# Patient Record
Sex: FEMALE | Race: WHITE | NOT HISPANIC OR LATINO | Employment: OTHER | URBAN - METROPOLITAN AREA
[De-identification: names, ages, dates, MRNs, and addresses within clinical notes are randomized per-mention and may not be internally consistent; named-entity substitution may affect disease eponyms.]

---

## 2019-01-07 RX ORDER — LISINOPRIL 20 MG/1
TABLET ORAL
Qty: 90 TABLET | Refills: 3 | Status: SHIPPED | OUTPATIENT
Start: 2019-01-07 | End: 2019-03-13 | Stop reason: ALTCHOICE

## 2019-03-06 PROBLEM — M41.20 IDIOPATHIC SCOLIOSIS AND KYPHOSCOLIOSIS: Status: ACTIVE | Noted: 2019-03-06

## 2019-03-06 PROBLEM — R26.0 ATAXIC GAIT: Status: ACTIVE | Noted: 2019-03-06

## 2019-03-06 PROBLEM — N30.01 ACUTE CYSTITIS WITH HEMATURIA: Status: ACTIVE | Noted: 2019-03-06

## 2019-03-06 PROBLEM — R32 URINARY INCONTINENCE: Status: ACTIVE | Noted: 2019-03-06

## 2019-03-06 PROBLEM — E78.2 MIXED HYPERLIPIDEMIA: Status: ACTIVE | Noted: 2019-03-06

## 2019-03-06 PROBLEM — M19.90 OSTEOARTHRITIS: Status: ACTIVE | Noted: 2019-03-06

## 2019-03-06 PROBLEM — R35.0 INCREASED FREQUENCY OF URINATION: Status: ACTIVE | Noted: 2019-03-06

## 2019-03-06 PROBLEM — K58.1 IRRITABLE BOWEL SYNDROME WITH CONSTIPATION: Status: ACTIVE | Noted: 2019-03-06

## 2019-03-06 PROBLEM — J30.9 ALLERGIC RHINITIS: Status: ACTIVE | Noted: 2019-03-06

## 2019-03-06 PROBLEM — I10 BENIGN ESSENTIAL HYPERTENSION: Status: ACTIVE | Noted: 2019-03-06

## 2019-03-06 PROBLEM — Z91.81 AT RISK FOR FALLS: Status: ACTIVE | Noted: 2019-03-06

## 2019-03-06 PROBLEM — J31.0 NON-ALLERGIC RHINITIS: Status: ACTIVE | Noted: 2019-03-06

## 2019-03-06 PROBLEM — M41.20 IDIOPATHIC SCOLIOSIS: Status: ACTIVE | Noted: 2019-03-06

## 2019-03-06 PROBLEM — K52.1 DIARRHEA DUE TO DRUG: Status: ACTIVE | Noted: 2019-03-06

## 2019-03-06 PROBLEM — M81.0 OSTEOPOROSIS: Status: ACTIVE | Noted: 2019-03-06

## 2019-03-13 ENCOUNTER — OFFICE VISIT (OUTPATIENT)
Dept: INTERNAL MEDICINE | Facility: CLINIC | Age: 84
End: 2019-03-13

## 2019-03-13 VITALS
DIASTOLIC BLOOD PRESSURE: 90 MMHG | BODY MASS INDEX: 20.58 KG/M2 | HEART RATE: 88 BPM | HEIGHT: 61 IN | SYSTOLIC BLOOD PRESSURE: 168 MMHG | TEMPERATURE: 99.2 F | WEIGHT: 109 LBS

## 2019-03-13 DIAGNOSIS — I10 BENIGN ESSENTIAL HYPERTENSION: ICD-10-CM

## 2019-03-13 DIAGNOSIS — E78.2 MIXED HYPERLIPIDEMIA: ICD-10-CM

## 2019-03-13 DIAGNOSIS — J06.9 ACUTE URI: ICD-10-CM

## 2019-03-13 DIAGNOSIS — Z00.00 MEDICARE ANNUAL WELLNESS VISIT, SUBSEQUENT: Primary | ICD-10-CM

## 2019-03-13 DIAGNOSIS — M81.0 AGE-RELATED OSTEOPOROSIS WITHOUT CURRENT PATHOLOGICAL FRACTURE: ICD-10-CM

## 2019-03-13 LAB
25(OH)D3 SERPL-MCNC: 59.3 NG/ML
ALBUMIN SERPL-MCNC: 4.39 G/DL (ref 3.2–4.8)
ALBUMIN/GLOB SERPL: 2.3 G/DL (ref 1.5–2.5)
ALP SERPL-CCNC: 73 U/L (ref 25–100)
ALT SERPL W P-5'-P-CCNC: 24 U/L (ref 7–40)
ANION GAP SERPL CALCULATED.3IONS-SCNC: 10 MMOL/L (ref 3–11)
ARTICHOKE IGE QN: 59 MG/DL (ref 0–130)
AST SERPL-CCNC: 28 U/L (ref 0–33)
BASOPHILS # BLD AUTO: 0.01 10*3/MM3 (ref 0–0.2)
BASOPHILS NFR BLD AUTO: 0.2 % (ref 0–1)
BILIRUB SERPL-MCNC: 0.6 MG/DL (ref 0.3–1.2)
BUN BLD-MCNC: 11 MG/DL (ref 9–23)
BUN/CREAT SERPL: 16.2 (ref 7–25)
CALCIUM SPEC-SCNC: 9.6 MG/DL (ref 8.7–10.4)
CHLORIDE SERPL-SCNC: 102 MMOL/L (ref 99–109)
CHOLEST SERPL-MCNC: 131 MG/DL (ref 0–200)
CO2 SERPL-SCNC: 28 MMOL/L (ref 20–31)
CREAT BLD-MCNC: 0.68 MG/DL (ref 0.6–1.3)
DEPRECATED RDW RBC AUTO: 47.1 FL (ref 37–54)
EOSINOPHIL # BLD AUTO: 0.03 10*3/MM3 (ref 0–0.3)
EOSINOPHIL NFR BLD AUTO: 0.5 % (ref 0–3)
ERYTHROCYTE [DISTWIDTH] IN BLOOD BY AUTOMATED COUNT: 13.2 % (ref 11.3–14.5)
GFR SERPL CREATININE-BSD FRML MDRD: 82 ML/MIN/1.73
GLOBULIN UR ELPH-MCNC: 1.9 GM/DL
GLUCOSE BLD-MCNC: 94 MG/DL (ref 70–100)
HCT VFR BLD AUTO: 38.9 % (ref 34.5–44)
HDLC SERPL-MCNC: 56 MG/DL (ref 40–60)
HGB BLD-MCNC: 13.1 G/DL (ref 11.5–15.5)
IMM GRANULOCYTES # BLD AUTO: 0.01 10*3/MM3 (ref 0–0.05)
IMM GRANULOCYTES NFR BLD AUTO: 0.2 % (ref 0–0.6)
LYMPHOCYTES # BLD AUTO: 1.14 10*3/MM3 (ref 0.6–4.8)
LYMPHOCYTES NFR BLD AUTO: 20.2 % (ref 24–44)
MCH RBC QN AUTO: 32.7 PG (ref 27–31)
MCHC RBC AUTO-ENTMCNC: 33.7 G/DL (ref 32–36)
MCV RBC AUTO: 97 FL (ref 80–99)
MONOCYTES # BLD AUTO: 0.67 10*3/MM3 (ref 0–1)
MONOCYTES NFR BLD AUTO: 11.9 % (ref 0–12)
NEUTROPHILS # BLD AUTO: 3.79 10*3/MM3 (ref 1.5–8.3)
NEUTROPHILS NFR BLD AUTO: 67 % (ref 41–71)
PLATELET # BLD AUTO: 283 10*3/MM3 (ref 150–450)
PMV BLD AUTO: 9.4 FL (ref 6–12)
POTASSIUM BLD-SCNC: 4.2 MMOL/L (ref 3.5–5.5)
PROT SERPL-MCNC: 6.3 G/DL (ref 5.7–8.2)
RBC # BLD AUTO: 4.01 10*6/MM3 (ref 3.89–5.14)
SODIUM BLD-SCNC: 140 MMOL/L (ref 132–146)
TRIGL SERPL-MCNC: 62 MG/DL (ref 0–150)
TSH SERPL DL<=0.05 MIU/L-ACNC: 1.98 MIU/ML (ref 0.35–5.35)
WBC NRBC COR # BLD: 5.65 10*3/MM3 (ref 3.5–10.8)

## 2019-03-13 PROCEDURE — 99213 OFFICE O/P EST LOW 20 MIN: CPT | Performed by: NURSE PRACTITIONER

## 2019-03-13 PROCEDURE — 80061 LIPID PANEL: CPT | Performed by: NURSE PRACTITIONER

## 2019-03-13 PROCEDURE — 80053 COMPREHEN METABOLIC PANEL: CPT | Performed by: NURSE PRACTITIONER

## 2019-03-13 PROCEDURE — 36415 COLL VENOUS BLD VENIPUNCTURE: CPT | Performed by: NURSE PRACTITIONER

## 2019-03-13 PROCEDURE — 82306 VITAMIN D 25 HYDROXY: CPT | Performed by: NURSE PRACTITIONER

## 2019-03-13 PROCEDURE — G0008 ADMIN INFLUENZA VIRUS VAC: HCPCS | Performed by: NURSE PRACTITIONER

## 2019-03-13 PROCEDURE — 84443 ASSAY THYROID STIM HORMONE: CPT | Performed by: NURSE PRACTITIONER

## 2019-03-13 PROCEDURE — G0439 PPPS, SUBSEQ VISIT: HCPCS | Performed by: NURSE PRACTITIONER

## 2019-03-13 PROCEDURE — 90653 IIV ADJUVANT VACCINE IM: CPT | Performed by: NURSE PRACTITIONER

## 2019-03-13 PROCEDURE — 82570 ASSAY OF URINE CREATININE: CPT | Performed by: NURSE PRACTITIONER

## 2019-03-13 PROCEDURE — 82043 UR ALBUMIN QUANTITATIVE: CPT | Performed by: NURSE PRACTITIONER

## 2019-03-13 PROCEDURE — 85025 COMPLETE CBC W/AUTO DIFF WBC: CPT | Performed by: NURSE PRACTITIONER

## 2019-03-13 RX ORDER — CONJUGATED ESTROGENS 0.62 MG/G
1 CREAM VAGINAL 3 TIMES WEEKLY
COMMUNITY
Start: 2018-12-22 | End: 2019-10-10

## 2019-03-13 RX ORDER — CLOPIDOGREL BISULFATE 75 MG/1
1 TABLET ORAL DAILY
COMMUNITY
Start: 2018-12-29 | End: 2020-02-03 | Stop reason: SDUPTHER

## 2019-03-13 RX ORDER — FAMOTIDINE 20 MG/1
1 TABLET, FILM COATED ORAL DAILY
COMMUNITY
Start: 2018-12-29 | End: 2020-02-05 | Stop reason: SDUPTHER

## 2019-03-13 RX ORDER — ASCORBIC ACID 500 MG
1 TABLET ORAL DAILY
COMMUNITY
Start: 2013-01-25 | End: 2022-12-13

## 2019-03-13 RX ORDER — ACETAMINOPHEN ER 650 MG TABLET,EXTENDED RELEASE 650 MG
1 TABLET, EXTENDED RELEASE ORAL DAILY
COMMUNITY
Start: 2015-10-26

## 2019-03-13 RX ORDER — LISINOPRIL 40 MG/1
40 TABLET ORAL DAILY
Qty: 90 TABLET | Refills: 1 | Status: SHIPPED | OUTPATIENT
Start: 2019-03-13 | End: 2019-10-07 | Stop reason: SDUPTHER

## 2019-03-13 RX ORDER — ATORVASTATIN CALCIUM 40 MG/1
1 TABLET, FILM COATED ORAL DAILY
COMMUNITY
Start: 2018-12-29 | End: 2020-02-03 | Stop reason: SDUPTHER

## 2019-03-13 NOTE — PATIENT INSTRUCTIONS
INCREASE YOUR LISINOPRIL TO 40MG DAILY.  TAKE TWO OF YOUR PILLS CURRENTLY UNTIL YOU GET YOUR NEW PRESCRIPTION IN THE MAIL.    Fall Prevention in the Home, Adult  Falls can cause injuries. They can happen to people of all ages. There are many things you can do to make your home safe and to help prevent falls. Ask for help when making these changes, if needed.  What actions can I take to prevent falls?  General Instructions  · Use good lighting in all rooms. Replace any light bulbs that burn out.  · Turn on the lights when you go into a dark area. Use night-lights.  · Keep items that you use often in easy-to-reach places. Lower the shelves around your home if necessary.  · Set up your furniture so you have a clear path. Avoid moving your furniture around.  · Do not have throw rugs and other things on the floor that can make you trip.  · Avoid walking on wet floors.  · If any of your floors are uneven, fix them.  · Add color or contrast paint or tape to clearly ezra and help you see:  ? Any grab bars or handrails.  ? First and last steps of stairways.  ? Where the edge of each step is.  · If you use a stepladder:  ? Make sure that it is fully opened. Do not climb a closed stepladder.  ? Make sure that both sides of the stepladder are locked into place.  ? Ask someone to hold the stepladder for you while you use it.  · If there are any pets around you, be aware of where they are.  What can I do in the bathroom?  · Keep the floor dry. Clean up any water that spills onto the floor as soon as it happens.  · Remove soap buildup in the tub or shower regularly.  · Use non-skid mats or decals on the floor of the tub or shower.  · Attach bath mats securely with double-sided, non-slip rug tape.  · If you need to sit down in the shower, use a plastic, non-slip stool.  · Install grab bars by the toilet and in the tub and shower. Do not use towel bars as grab bars.  What can I do in the bedroom?  · Make sure that you have a light  by your bed that is easy to reach.  · Do not use any sheets or blankets that are too big for your bed. They should not hang down onto the floor.  · Have a firm chair that has side arms. You can use this for support while you get dressed.  What can I do in the kitchen?  · Clean up any spills right away.  · If you need to reach something above you, use a strong step stool that has a grab bar.  · Keep electrical cords out of the way.  · Do not use floor polish or wax that makes floors slippery. If you must use wax, use non-skid floor wax.  What can I do with my stairs?  · Do not leave any items on the stairs.  · Make sure that you have a light switch at the top of the stairs and the bottom of the stairs. If you do not have them, ask someone to add them for you.  · Make sure that there are handrails on both sides of the stairs, and use them. Fix handrails that are broken or loose. Make sure that handrails are as long as the stairways.  · Install non-slip stair treads on all stairs in your home.  · Avoid having throw rugs at the top or bottom of the stairs. If you do have throw rugs, attach them to the floor with carpet tape.  · Choose a carpet that does not hide the edge of the steps on the stairway.  · Check any carpeting to make sure that it is firmly attached to the stairs. Fix any carpet that is loose or worn.  What can I do on the outside of my home?  · Use bright outdoor lighting.  · Regularly fix the edges of walkways and driveways and fix any cracks.  · Remove anything that might make you trip as you walk through a door, such as a raised step or threshold.  · Trim any bushes or trees on the path to your home.  · Regularly check to see if handrails are loose or broken. Make sure that both sides of any steps have handrails.  · Install guardrails along the edges of any raised decks and porches.  · Clear walking paths of anything that might make someone trip, such as tools or rocks.  · Have any leaves, snow, or ice  cleared regularly.  · Use sand or salt on walking paths during winter.  · Clean up any spills in your garage right away. This includes grease or oil spills.  What other actions can I take?  · Wear shoes that:  ? Have a low heel. Do not wear high heels.  ? Have rubber bottoms.  ? Are comfortable and fit you well.  ? Are closed at the toe. Do not wear open-toe sandals.  · Use tools that help you move around (mobility aids) if they are needed. These include:  ? Canes.  ? Walkers.  ? Scooters.  ? Crutches.  · Review your medicines with your doctor. Some medicines can make you feel dizzy. This can increase your chance of falling.  Ask your doctor what other things you can do to help prevent falls.  Where to find more information  · Centers for Disease Control and Prevention, [employer]AD[employer]: https://cdc.gov  · National [place] on Aging: https://aa8dpqz.jordan.nih.gov  Contact a doctor if:  · You are afraid of falling at home.  · You feel weak, drowsy, or dizzy at home.  · You fall at home.  Summary  · There are many simple things that you can do to make your home safe and to help prevent falls.  · Ways to make your home safe include removing tripping hazards and installing grab bars in the bathroom.  · Ask for help when making these changes in your home.  This information is not intended to replace advice given to you by your health care provider. Make sure you discuss any questions you have with your health care provider.  Document Released: 10/14/2010 Document Revised: 08/02/2018 Document Reviewed: 08/02/2018  [employer] Interactive Patient Education © 2019 [employer]    Understanding Your Risk for Falls  Each year, millions of people suffer serious injuries from falls. It is important to understand your risk for falling. Talk with your health care provider about your risk and what you can do to lower it. There are actions you can take at home to lower your risk.  If you do have a serious fall, it is important to tell your  health care provider. Falling once raises your risk for falling again.  How can falls affect me?  Serious injuries from falls are common. These include:  · Broken bones. Most hip fractures are caused by falls.  · Traumatic brain injury (TBI). Falls are the most common cause of TBI.    Fear of falling can also cause you to avoid activities and stay at home. This can make your muscles weaker and actually raise your risk for a fall.  What can increase my risk?  Serious injuries from a fall most often happen to people older than age 65. Children and young adults ages 15-29 are also at higher risk. The more risk factors you have for falling, the higher your risk. Risk factors include:  · Weakness in the lower body.  · Lack (deficiency) of vitamin D.  · Weak bones (osteoporosis).  · Being generally weak or confused due to long-term (chronic) illness.  · Dizziness or balance problems.  · Poor vision.  · Having depression.  · Medicine that causes dizziness or drowsiness. These can include medicines for your blood pressure, heart, anxiety, insomnia, or edema, as well as pain medicines and muscle relaxants.  · Drinking alcohol.  · Foot pain or improper footwear.  · Working at a dangerous job.  · Having had a fall in the past.  · Tripping hazards at home, such as floor clutter or loose rugs, or poor lighting.  · Having pets or clutter in your home.    What actions can I take to lower my risk of falling?  · Maintain physical fitness:  ? Do strength and balance exercises. Consider taking a regular class to build strength and balance. Yoga and khoa chi are good options.  ? Have your eyes checked every year and your vision prescription updated as needed.  · Remove all clutter from walkways and stairways, including extension cords.  · Use a cordless phone.  · Do not use throw rugs. Make sure all carpeting is taped or tacked down securely.  · Use good lighting in all rooms. Keep a flashlight near your bed.  · Make sure there is a  clear path from your bed to the bathroom. Use night-lights.  · Install grab bars for your tub, shower, and toilet. Use a bath mat in your tub or shower.  · Attach secure railings on both sides of your stairs.  · Repair uneven or broken steps.  · Use a cane or walker as directed by your health care provider.  · Wear nonskid shoes. Do not wear high heels. Do not walk around the house in socks or slippers.  · Avoid walking on icy or slippery surfaces. Walk on the grass instead of on icy or slick sidewalks. Where you can, use ice melt to get rid of ice on walkways.  Questions to ask your health care provider  · Can you help me evaluate my risk for a fall?  · Do any of my medicines make me more likely to fall?  · Should I take a vitamin D supplement?  · What exercises can I do to improve my strength and balance?  · Should I make an appointment to have my vision checked?  · Do I need a bone density test to check for osteoporosis?  · Would it help to use a cane or a walker?  Where to find more information  · Centers for Disease Control and Prevention, [place]: cdc.gov  · Community-Based Fall Prevention Programs: cdc.gov  · National [place] on Aging: we9lsvg.jordan.nih.gov  Contact a health care provider if:  · You fall at home.  · You are afraid of falling at home.  · You feel weak, drowsy, or dizzy at home.  Summary  · People 65 and older are at high risk for falling. However, older people are not the only ones injured in falls. Children and young adults have a higher-than-normal risk, too.  · Talk with your health care provider about your risks for falling and how to lower those risks.  · Taking certain precautions at home can lower your risk for falling.  · If you fall, always tell your health care provider.  This information is not intended to replace advice given to you by your health care provider. Make sure you discuss any questions you have with your health care provider.  Document Released: 08/01/2018 Document  Revised: 2018 Document Reviewed: 2018  [employer] Interactive Patient Education © 2019 [employer] Inc.    Medicare Wellness  Personal Prevention Plan of Service     Date of Office Visit:  2019  Encounter Provider:  PAMELA Morrison  Place of Service:  [facility] INTERNAL MEDICINE  Patient Name: Megan Garcia  :  1935    As part of the Medicare Wellness portion of your visit today, we are providing you with this personalized preventive plan of services (PPPS). This plan is based upon recommendations of the [place] Preventive Services Task Force (USPSTF) and the Advisory Committee on Immunization Practices (ACIP).    This lists the preventive care services that should be considered, and provides dates of when you are due. Items listed as completed are up-to-date and do not require any further intervention.    Health Maintenance   Topic Date Due   • TDAP/TD VACCINES (1 - Tdap) 1954   • ZOSTER VACCINE (2 of 3) 2009   • MEDICARE ANNUAL WELLNESS  2019   • LIPID PANEL  2019   • DXA SCAN  2019   • INFLUENZA VACCINE  Completed   • PNEUMOCOCCAL VACCINES (65+ LOW/MEDIUM RISK)  Completed       Orders Placed This Encounter   Procedures   • DEXA Bone Density Axial     Standing Status:   Future     Standing Expiration Date:   3/13/2020     Order Specific Question:   Reason for Exam:     Answer:   osteoporosis   • Fluad Quad >65 years   • Comprehensive Metabolic Panel     Standing Status:   Future     Number of Occurrences:   1   • Lipid Panel     Standing Status:   Future     Number of Occurrences:   1   • Vitamin D 25 Hydroxy     Standing Status:   Future     Number of Occurrences:   1     Standing Expiration Date:   3/13/2020   • TSH Rfx On Abnormal To Free T4     Standing Status:   Future     Number of Occurrences:   1     Standing Expiration Date:   3/13/2020   • Microalbumin / Creatinine Urine Ratio - Urine, Clean Catch     Standing Status:    Future     Number of Occurrences:   1     Standing Expiration Date:   3/13/2020   • CBC Auto Differential   • CBC & Differential     Standing Status:   Future     Number of Occurrences:   1     Order Specific Question:   Manual Differential     Answer:   No       Return for Annual physical, Next scheduled follow up.

## 2019-03-13 NOTE — PROGRESS NOTES
QUICK REFERENCE INFORMATION:  The ABCs of the Annual Wellness Visit    Subsequent Medicare Wellness Visit    HEALTH RISK ASSESSMENT    1935    Recent Hospitalizations:  No hospitalization(s) within the last year..        Current Medical Providers:  Patient Care Team:  Nany Rios APRN as PCP - General (Internal Medicine)        Smoking Status:  Social History     Tobacco Use   Smoking Status Never Smoker   Smokeless Tobacco Never Used       Alcohol Consumption:  Social History     Substance and Sexual Activity   Alcohol Use No   • Frequency: Never       Depression Screen:   PHQ-2/PHQ-9 Depression Screening 3/13/2019   Little interest or pleasure in doing things 0   Feeling down, depressed, or hopeless 0   Total Score 0       Health Habits and Functional and Cognitive Screening:  Functional & Cognitive Status 3/13/2019   Do you have difficulty preparing food and eating? Yes   Do you have difficulty bathing yourself, getting dressed or grooming yourself? No   Do you have difficulty using the toilet? No   Do you have difficulty moving around from place to place? Yes   Do you have trouble with steps or getting out of a bed or a chair? Yes   In the past year have you fallen or experienced a near fall? Yes   Current Diet Well Balanced Diet   Dental Exam Up to date   Eye Exam Up to date   Exercise (times per week) 7 times per week   Current Exercise Activities Include Walking   Do you need help using the phone?  No   Are you deaf or do you have serious difficulty hearing?  No   Do you need help with transportation? Yes   Do you need help shopping? Yes   Do you need help preparing meals?  No   Do you need help with housework?  No   Do you need help with laundry? No   Do you need help taking your medications? No   Do you need help managing money? No   Do you ever drive or ride in a car without wearing a seat belt? No   Have you felt unusual stress, anger or loneliness in the last month? Yes   Who do you live  with? Community   If you need help, do you have trouble finding someone available to you? No   Have you been bothered in the last four weeks by sexual problems? No   Do you have difficulty concentrating, remembering or making decisions? Yes           Does the patient have evidence of cognitive impairment? No    Aspirin use counseling: On clopidrogel as an alternative (due to ASA contraindication)      Recent Lab Results:  CMP:     Lipid Panel:     HbA1c:       Visual Acuity:  No exam data present    Age-appropriate Screening Schedule:  Refer to the list below for future screening recommendations based on patient's age, sex and/or medical conditions. Orders for these recommended tests are listed in the plan section. The patient has been provided with a written plan.    Health Maintenance   Topic Date Due   • TDAP/TD VACCINES (1 - Tdap) [birth year]06/1954   • ZOSTER VACCINE (2 of 3) 04/14/2009   • LIPID PANEL  03/13/2019   • DXA SCAN  03/13/2019   • INFLUENZA VACCINE  Completed   • PNEUMOCOCCAL VACCINES (65+ LOW/MEDIUM RISK)  Completed        Subjective   History of Present Illness    Megan Garcia is a 84 y.o. female who presents for an Subsequent Wellness Visit.    The following portions of the patient's history were reviewed and updated as appropriate: allergies, current medications, past family history, past medical history, past social history, past surgical history and problem list.    Outpatient Medications Prior to Visit   Medication Sig Dispense Refill   • acetaminophen (TYLENOL 8 HOUR ARTHRITIS PAIN) 650 MG 8 hr tablet Take 1 tablet by mouth Daily.     • atorvastatin (LIPITOR) 40 MG tablet Take 1 tablet by mouth Daily.     • clopidogrel (PLAVIX) 75 MG tablet Take 1 tablet by mouth Daily.     • famotidine (PEPCID) 20 MG tablet Take 1 tablet by mouth Daily.     • Multiple Vitamins-Minerals (MULTIVITAMIN ADULT PO) Take 1 tablet by mouth Daily.     • PREMARIN 0.625 MG/GM vaginal cream Insert 1 application into the  vagina 3 (Three) Times a Week.     • vitamin C (ASCORBIC ACID) 500 MG tablet Take 1 tablet by mouth Daily.     • lisinopril (PRINIVIL,ZESTRIL) 20 MG tablet TAKE 1 TABLET BY MOUTH  EVERY DAY 90 tablet 3     No facility-administered medications prior to visit.        Patient Active Problem List   Diagnosis   • Acute cystitis with hematuria   • Allergic rhinitis   • At risk for falls   • Ataxic gait   • Benign essential hypertension   • Diarrhea due to drug   • Idiopathic scoliosis and kyphoscoliosis   • Increased frequency of urination   • Irritable bowel syndrome with constipation   • Mixed hyperlipidemia   • Non-allergic rhinitis   • Osteoarthritis   • Osteoporosis   • Urinary incontinence       Advance Care Planning:  has an advance directive - a copy HAS NOT been provided    Identification of Risk Factors:  Risk factors include: cardiovascular risk, increased fall risk, depression and vision limitations.    Review of Systems   Constitutional: Negative for chills, fatigue and fever.   HENT: Positive for congestion. Negative for ear pain and sinus pressure.    Respiratory: Positive for cough. Negative for chest tightness, shortness of breath and wheezing.    Cardiovascular: Negative for chest pain and palpitations.   Gastrointestinal: Positive for constipation. Negative for abdominal pain and blood in stool.   Skin: Negative for color change.   Allergic/Immunologic: Negative for environmental allergies.   Neurological: Negative for dizziness, speech difficulty and headaches.   Psychiatric/Behavioral: Positive for confusion. The patient is nervous/anxious.        Compared to one year ago, the patient feels her physical health is worse.  Compared to one year ago, the patient feels her mental health is worse.    Objective     Physical Exam    Vitals:    03/13/19 0956   BP: 168/90   BP Location: Left arm   Patient Position: Sitting   Cuff Size: Adult   Pulse: 88   Temp: 99.2 °F (37.3 °C)   TempSrc: Temporal   Weight:  "49.4 kg (109 lb)   Height: 154 cm (60.63\")   PainSc: 0-No pain       Patient's Body mass index is 20.85 kg/m². BMI is below normal parameters. Recommendations include: treating the underlying disease process.      Assessment/Plan   Patient Self-Management and Personalized Health Advice  The patient has been provided with information about: exercise, fall prevention and mental health concerns and preventive services including:   · Bone densitometry screening, Fall Risk plan of care done.    Visit Diagnoses:    ICD-10-CM ICD-9-CM   1. Benign essential hypertension I10 401.1   2. Mixed hyperlipidemia E78.2 272.2   3. Age-related osteoporosis without current pathological fracture M81.0 733.01   4. Acute URI J06.9 465.9       Orders Placed This Encounter   Procedures   • DEXA Bone Density Axial     Standing Status:   Future     Standing Expiration Date:   3/13/2020     Order Specific Question:   Reason for Exam:     Answer:   osteoporosis   • Fluad Quad >65 years   • Comprehensive Metabolic Panel     Standing Status:   Future     Number of Occurrences:   1   • Lipid Panel     Standing Status:   Future     Number of Occurrences:   1   • Vitamin D 25 Hydroxy     Standing Status:   Future     Number of Occurrences:   1     Standing Expiration Date:   3/13/2020   • TSH Rfx On Abnormal To Free T4     Standing Status:   Future     Number of Occurrences:   1     Standing Expiration Date:   3/13/2020   • Microalbumin / Creatinine Urine Ratio - Urine, Clean Catch     Standing Status:   Future     Number of Occurrences:   1     Standing Expiration Date:   3/13/2020   • CBC Auto Differential   • CBC & Differential     Standing Status:   Future     Number of Occurrences:   1     Order Specific Question:   Manual Differential     Answer:   No       Outpatient Encounter Medications as of 3/13/2019   Medication Sig Dispense Refill   • acetaminophen (TYLENOL 8 HOUR ARTHRITIS PAIN) 650 MG 8 hr tablet Take 1 tablet by mouth Daily.     • " atorvastatin (LIPITOR) 40 MG tablet Take 1 tablet by mouth Daily.     • clopidogrel (PLAVIX) 75 MG tablet Take 1 tablet by mouth Daily.     • famotidine (PEPCID) 20 MG tablet Take 1 tablet by mouth Daily.     • Multiple Vitamins-Minerals (MULTIVITAMIN ADULT PO) Take 1 tablet by mouth Daily.     • PREMARIN 0.625 MG/GM vaginal cream Insert 1 application into the vagina 3 (Three) Times a Week.     • vitamin C (ASCORBIC ACID) 500 MG tablet Take 1 tablet by mouth Daily.     • [DISCONTINUED] lisinopril (PRINIVIL,ZESTRIL) 20 MG tablet TAKE 1 TABLET BY MOUTH  EVERY DAY 90 tablet 3   • lisinopril (PRINIVIL,ZESTRIL) 40 MG tablet Take 1 tablet by mouth Daily. 90 tablet 1     No facility-administered encounter medications on file as of 3/13/2019.        Reviewed use of high risk medication in the elderly: not applicable  Reviewed for potential of harmful drug interactions in the elderly: not applicable    Follow Up:  Return for Annual physical, Next scheduled follow up.     An After Visit Summary and PPPS with all of these plans were given to the patient.

## 2019-03-13 NOTE — PROGRESS NOTES
"Megan Garcia  1935  [id number]  Patient Care Team:  Nany Rios APRN as PCP - General (Internal Medicine)    Megan Garcia is a pleasant 84 y.o. female who presents for evaluation of Annual Exam (Patient is fasting) and Cough (x 4 days )          Chief Complaint   Patient presents with   • Annual Exam     Patient is fasting   • Cough     x 4 days        HPI:   Ms. Garcia is a pleasant 84 year old female who presents today for her annual wellness exam, but also with some complaints of a cough, congestion, runny nose and noticeably higher blood pressure readings at home.  Patient states she recently lost her  on November 30, 2018.  She states she is still trying to grieve and has been under significant stress since his passing.  She states she is trying to sell their home, she has moved into a new apartment and trying to figure out how to manage life on her own after a 60 year long marriage.  Ms. Garcia says that for about 5 days now, she has been having sneezing (which has improved), runny nose, congestion and cough (non-productive).  She says that she has noticed since the beginning of the symptoms, she has noticed improvement in all of them.  She denies fever.  She denies any productive cough, no shortness of breath and no chest pain.  She states she has had pneumonia in the past, and wanted to make sure this is not pneumonia again.  She denies any abdominal pain, no back pain and no n/v/d.  She states that her appetite has been normal and has had adequate intake of fluids.    She also states that in February, she was at the  office dealing with some personal issues and she began feeling nauseated, lost her ability to stand and she fell onto a table she was holding onto.  She states that after \"some time\" had passed, and after she got back to her home, she drank some water and felt like it was her \"blood pressure\".  She had someone at the apartment facility take her blood " "pressure and it was \"181/84\".  She states that she has noticed some higher readings.  She denies further c/o of these episodes but feels like her blood pressure is fluctuating at a higher level.  She was with her daughter when this occurred and was not evaluated for her symptoms.        Past Medical History:   Diagnosis Date   • Allergic    • Arthritis    • Hyperlipidemia    • Hypertension    • Intervertebral disc displacement    • Left periventricular lacunar stroke 10/19/2015       Past Surgical History:   Procedure Laterality Date   • LAMINECTOMY  1974   • TOTAL KNEE ARTHROPLASTY Left 2004       Family History   Problem Relation Age of Onset   • Coronary artery disease Father        Social History     Tobacco Use   Smoking Status Never Smoker   Smokeless Tobacco Never Used       No Known Allergies    Review of Systems   Constitutional: Negative.    HENT: Positive for congestion and sinus pressure. Negative for tinnitus and trouble swallowing.    Eyes: Negative for redness and itching.   Respiratory: Positive for cough. Negative for chest tightness, shortness of breath and wheezing.    Cardiovascular: Negative.    Gastrointestinal: Positive for constipation and vomiting. Negative for nausea.   Endocrine: Negative.    Genitourinary: Negative.    Musculoskeletal: Positive for gait problem.   Skin: Negative.    Hematological: Negative.    Psychiatric/Behavioral: Positive for stress. Negative for suicidal ideas. The patient is nervous/anxious.        Vitals:    03/13/19 0956   BP: 168/90   BP Location: Left arm   Patient Position: Sitting   Cuff Size: Adult   Pulse: 88   Temp: 99.2 °F (37.3 °C)   TempSrc: Temporal   Weight: 49.4 kg (109 lb)   Height: 154 cm (60.63\")   PainSc: 0-No pain         Current Outpatient Medications:   •  acetaminophen (TYLENOL 8 HOUR ARTHRITIS PAIN) 650 MG 8 hr tablet, Take 1 tablet by mouth Daily., Disp: , Rfl:   •  atorvastatin (LIPITOR) 40 MG tablet, Take 1 tablet by mouth Daily., Disp: , " Rfl:   •  clopidogrel (PLAVIX) 75 MG tablet, Take 1 tablet by mouth Daily., Disp: , Rfl:   •  famotidine (PEPCID) 20 MG tablet, Take 1 tablet by mouth Daily., Disp: , Rfl:   •  Multiple Vitamins-Minerals (MULTIVITAMIN ADULT PO), Take 1 tablet by mouth Daily., Disp: , Rfl:   •  PREMARIN 0.625 MG/GM vaginal cream, Insert 1 application into the vagina 3 (Three) Times a Week., Disp: , Rfl:   •  vitamin C (ASCORBIC ACID) 500 MG tablet, Take 1 tablet by mouth Daily., Disp: , Rfl:   •  lisinopril (PRINIVIL,ZESTRIL) 40 MG tablet, Take 1 tablet by mouth Daily., Disp: 90 tablet, Rfl: 1    Physical Exam   Constitutional: She is oriented to person, place, and time. She is cooperative. She does not have a sickly appearance. She does not appear ill.   HENT:   Head: Normocephalic.   Right Ear: Tympanic membrane normal. No tenderness. Tympanic membrane is not bulging.   Left Ear: Tympanic membrane normal. No tenderness. Tympanic membrane is not bulging.   Nose: Nose normal.   Mouth/Throat: Uvula is midline, oropharynx is clear and moist and mucous membranes are normal. No oropharyngeal exudate or posterior oropharyngeal edema.   Eyes: Conjunctivae and lids are normal. Pupils are equal, round, and reactive to light.   Neck: Trachea normal and normal range of motion. Neck supple. No thyromegaly present.   Cardiovascular: Regular rhythm, S1 normal, S2 normal, normal heart sounds and normal pulses.   Pulmonary/Chest: Effort normal and breath sounds normal. No respiratory distress. She has no wheezes. She has no rhonchi. She has no rales.   Abdominal: Soft. Normal appearance. There is no tenderness.   Lymphadenopathy:     She has no cervical adenopathy.   Neurological: She is alert and oriented to person, place, and time. GCS eye subscore is 4. GCS verbal subscore is 5. GCS motor subscore is 6.   Skin: Skin is warm and dry. No rash noted.   Psychiatric: Her speech is normal and behavior is normal. Judgment and thought content normal.  Her mood appears anxious. Cognition and memory are normal.       Results Review:    None    Procedures    Assessment/Plan:    Problem List Items Addressed This Visit        Cardiovascular and Mediastinum    Benign essential hypertension    Relevant Medications    lisinopril (PRINIVIL,ZESTRIL) 40 MG tablet    Other Relevant Orders    Comprehensive Metabolic Panel    CBC & Differential    Microalbumin / Creatinine Urine Ratio - Urine, Clean Catch    CBC Auto Differential    Mixed hyperlipidemia    Relevant Medications    atorvastatin (LIPITOR) 40 MG tablet    Other Relevant Orders    Lipid Panel    TSH Rfx On Abnormal To Free T4       Musculoskeletal and Integument    Osteoporosis    Relevant Orders    Vitamin D 25 Hydroxy    DEXA Bone Density Axial      Other Visit Diagnoses     Medicare annual wellness visit, subsequent    -  Primary    Acute URI          INCREASE YOUR LISINOPRIL TO 40MG DAILY.  TAKE TWO OF YOUR PILLS CURRENTLY UNTIL YOU GET YOUR NEW PRESCRIPTION IN THE MAIL.      Plan of care reviewed with patient at the conclusion of today's visit. Education was provided regarding diagnosis, management and any prescribed or recommended OTC medications.  Patient verbalizes understanding of and agreement with management plan.    Patient instructed to increase her Lisinopril dose to 40mg daily.  A new prescription was sent for her but she was instructed to take two of her pills until her new medication arrives.         Return for Annual physical, Next scheduled follow up.    *Note that portions of this note were completed with a voice recognition program.  Efforts were made to edit the dictation but occasionally words are transcribed.    PAMELA Morrison

## 2019-03-15 LAB
CREAT 24H UR-MCNC: 58.9 MG/DL
MICROALBUMIN UR-MCNC: 6.3 UG/ML
MICROALBUMIN/CREAT UR: 10.7 MG/G CREAT (ref 0–30)

## 2019-03-27 ENCOUNTER — OFFICE VISIT (OUTPATIENT)
Dept: INTERNAL MEDICINE | Facility: CLINIC | Age: 84
End: 2019-03-27

## 2019-03-27 VITALS
BODY MASS INDEX: 20.77 KG/M2 | TEMPERATURE: 99 F | SYSTOLIC BLOOD PRESSURE: 164 MMHG | WEIGHT: 110 LBS | HEIGHT: 61 IN | DIASTOLIC BLOOD PRESSURE: 74 MMHG | HEART RATE: 80 BPM

## 2019-03-27 DIAGNOSIS — E78.2 MIXED HYPERLIPIDEMIA: ICD-10-CM

## 2019-03-27 DIAGNOSIS — M41.20 IDIOPATHIC SCOLIOSIS AND KYPHOSCOLIOSIS: ICD-10-CM

## 2019-03-27 DIAGNOSIS — I10 BENIGN ESSENTIAL HYPERTENSION: ICD-10-CM

## 2019-03-27 DIAGNOSIS — J31.0 NON-ALLERGIC RHINITIS: ICD-10-CM

## 2019-03-27 DIAGNOSIS — Z00.00 PREVENTATIVE HEALTH CARE: Primary | ICD-10-CM

## 2019-03-27 DIAGNOSIS — M81.0 AGE-RELATED OSTEOPOROSIS WITHOUT CURRENT PATHOLOGICAL FRACTURE: ICD-10-CM

## 2019-03-27 RX ORDER — ACETAMINOPHEN 500 MG
500 TABLET ORAL
COMMUNITY
End: 2021-08-02

## 2019-03-27 NOTE — PROGRESS NOTES
QUICK REFERENCE INFORMATION:  The ABCs of the Annual Wellness Visit    Initial Medicare Wellness Visit    HEALTH RISK ASSESSMENT    1935    Recent Hospitalizations:  No hospitalization(s) within the last year..        Current Medical Providers:  Patient Care Team:  Nany Rios APRN as PCP - General (Internal Medicine)        Smoking Status:  Social History     Tobacco Use   Smoking Status Never Smoker   Smokeless Tobacco Never Used       Alcohol Consumption:  Social History     Substance and Sexual Activity   Alcohol Use No   • Frequency: Never       Depression Screen:   PHQ-2/PHQ-9 Depression Screening 3/13/2019   Little interest or pleasure in doing things 0   Feeling down, depressed, or hopeless 0   Total Score 0       Health Habits and Functional and Cognitive Screening:  Functional & Cognitive Status 3/13/2019   Do you have difficulty preparing food and eating? Yes   Do you have difficulty bathing yourself, getting dressed or grooming yourself? No   Do you have difficulty using the toilet? No   Do you have difficulty moving around from place to place? Yes   Do you have trouble with steps or getting out of a bed or a chair? Yes   In the past year have you fallen or experienced a near fall? Yes   Current Diet Well Balanced Diet   Dental Exam Up to date   Eye Exam Up to date   Exercise (times per week) 7 times per week   Current Exercise Activities Include Walking   Do you need help using the phone?  No   Are you deaf or do you have serious difficulty hearing?  No   Do you need help with transportation? Yes   Do you need help shopping? Yes   Do you need help preparing meals?  No   Do you need help with housework?  No   Do you need help with laundry? No   Do you need help taking your medications? No   Do you need help managing money? No   Do you ever drive or ride in a car without wearing a seat belt? No   Have you felt unusual stress, anger or loneliness in the last month? Yes   Who do you live with?  Community   If you need help, do you have trouble finding someone available to you? No   Have you been bothered in the last four weeks by sexual problems? No   Do you have difficulty concentrating, remembering or making decisions? Yes           Does the patient have evidence of cognitive impairment? Yes    Asiprin use counseling: Does not need ASA (and currently is not on it)      Recent Lab Results:  CMP:  Lab Results   Component Value Date    BUN 11 03/13/2019    CREATININE 0.68 03/13/2019    EGFRIFNONA 82 03/13/2019    BCR 16.2 03/13/2019     03/13/2019    K 4.2 03/13/2019    CO2 28.0 03/13/2019    CALCIUM 9.6 03/13/2019    ALBUMIN 4.39 03/13/2019    BILITOT 0.6 03/13/2019    ALKPHOS 73 03/13/2019    AST 28 03/13/2019    ALT 24 03/13/2019     HbA1c:  No results found for: HGBA1C  Microalbumin:  Lab Results   Component Value Date    MICROALBUR 6.3 03/13/2019     Lipid Panel  Lab Results   Component Value Date    CHOL 131 03/13/2019    TRIG 62 03/13/2019    HDL 56 03/13/2019    LDL 59 03/13/2019    AST 28 03/13/2019    ALT 24 03/13/2019       Visual Acuity:  No exam data present    Age-appropriate Screening Schedule:  Refer to the list below for future screening recommendations based on patient's age, sex and/or medical conditions. Orders for these recommended tests are listed in the plan section. The patient has been provided with a written plan.    Health Maintenance   Topic Date Due   • TDAP/TD VACCINES (1 - Tdap) 03/06/1954   • ZOSTER VACCINE (2 of 3) 04/14/2009   • DXA SCAN  03/13/2019   • LIPID PANEL  03/13/2020   • INFLUENZA VACCINE  Completed   • PNEUMOCOCCAL VACCINES (65+ LOW/MEDIUM RISK)  Completed        Subjective   History of Present Illness    Megan Garcia is a 84 y.o. female who presents for an Annual Wellness Visit.  Since I last saw her she lost her .  Prior to his death they had plan to move into a seniors facility.  She has moved there and has made the adjustment.  She feels that  she has adequate support, participates in activities there and her family has been supportive.  She continues to experience back pain but has not fallen.  She otherwise feels well.    The following portions of the patient's history were reviewed and updated as appropriate: allergies, current medications, past family history, past medical history, past social history, past surgical history and problem list.    Outpatient Medications Prior to Visit   Medication Sig Dispense Refill   • acetaminophen (TYLENOL) 500 MG tablet Take 1,000 mg by mouth. Takes 2 tablets with breakfast  2 tablets w/supper     • atorvastatin (LIPITOR) 40 MG tablet Take 1 tablet by mouth Daily.     • CALCIUM PO Take 1 tablet by mouth Daily. 2 gummies  Tries to take it daily     • clopidogrel (PLAVIX) 75 MG tablet Take 1 tablet by mouth Daily.     • famotidine (PEPCID) 20 MG tablet Take 1 tablet by mouth Daily.     • lisinopril (PRINIVIL,ZESTRIL) 40 MG tablet Take 1 tablet by mouth Daily. 90 tablet 1   • Multiple Vitamins-Minerals (MULTIVITAMIN ADULT PO) Take 1 tablet by mouth Daily.     • vitamin C (ASCORBIC ACID) 500 MG tablet Take 1 tablet by mouth Daily.     • acetaminophen (TYLENOL 8 HOUR ARTHRITIS PAIN) 650 MG 8 hr tablet Take 1 tablet by mouth Daily.     • PREMARIN 0.625 MG/GM vaginal cream Insert 1 application into the vagina 3 (Three) Times a Week.       No facility-administered medications prior to visit.        Patient Active Problem List   Diagnosis   • Allergic rhinitis   • At risk for falls   • Ataxic gait   • Benign essential hypertension   • Idiopathic scoliosis and kyphoscoliosis   • Irritable bowel syndrome with constipation   • Mixed hyperlipidemia   • Non-allergic rhinitis   • Osteoarthritis   • Osteoporosis   • Urinary incontinence       Advance Care Planning:  Patient has an advance directive - a copy has not been provided. Have asked the patient to send this to us to add to record    Identification of Risk Factors:  Risk  factors include: Depression Screen Positive;   Discussed her major life changes at length.  She has good support, and denies feeling depressed. .    Review of Systems   Constitutional: Positive for fatigue. Negative for chills and fever.   HENT: Positive for congestion and postnasal drip. Negative for ear pain and sinus pressure.    Respiratory: Negative for cough, chest tightness, shortness of breath and wheezing.    Cardiovascular: Negative for chest pain and palpitations.   Gastrointestinal: Positive for diarrhea. Negative for abdominal pain, blood in stool and constipation.   Skin: Negative for color change.   Allergic/Immunologic: Negative for environmental allergies.   Neurological: Negative for dizziness, speech difficulty and headaches.   Psychiatric/Behavioral: Negative for confusion. The patient is nervous/anxious.        Compared to one year ago, the patient feels her physical health is the same.  Compared to one year ago, the patient feels her mental health is the same.    Objective     Physical Exam   Constitutional: She is oriented to person, place, and time. She appears well-developed and well-nourished.   HENT:   Head: Normocephalic and atraumatic.   Right Ear: External ear normal.   Left Ear: External ear normal.   Nose: Nose normal.   Mouth/Throat: Oropharynx is clear and moist. No oropharyngeal exudate.   Eyes: Conjunctivae and EOM are normal. Pupils are equal, round, and reactive to light.   Neck: Normal range of motion. Neck supple. No JVD present. No thyromegaly present.   Cardiovascular: Normal rate, regular rhythm, normal heart sounds and intact distal pulses. Exam reveals no gallop and no friction rub.   No murmur heard.  Pulmonary/Chest: Effort normal and breath sounds normal. No respiratory distress. She has no wheezes. She has no rales.   Abdominal: Soft. Bowel sounds are normal. She exhibits no distension and no mass. There is no tenderness. There is no rebound and no guarding. No  "hernia.   Musculoskeletal: Normal range of motion. She exhibits no edema or tenderness.   Lymphadenopathy:     She has no cervical adenopathy.   Neurological: She is alert and oriented to person, place, and time. She displays normal reflexes. No cranial nerve deficit or sensory deficit. She exhibits normal muscle tone. Coordination normal.   Skin: Skin is warm and dry. No rash noted. No erythema.   Psychiatric: She has a normal mood and affect. Her behavior is normal. Judgment and thought content normal.   Nursing note and vitals reviewed.       Procedures     Vitals:    03/27/19 1308   BP: 164/74   BP Location: Left arm   Patient Position: Sitting   Cuff Size: Adult   Pulse: 80   Temp: 99 °F (37.2 °C)   TempSrc: Temporal   Weight: 49.9 kg (110 lb)   Height: 154 cm (60.63\")   PainSc:   5   PainLoc: Hip       Patient's Body mass index is 21.04 kg/m². BMI is within normal parameters. No follow-up required..      Assessment/Plan   Patient Self-Management and Personalized Health Advice  The patient has been provided with information about: diet, exercise and mental health concerns and preventive services including:   · Depression Screening (15 minutes face to face, Code ).    Visit Diagnoses:    ICD-10-CM ICD-9-CM   1. Benign essential hypertension I10 401.1   2. Mixed hyperlipidemia E78.2 272.2   3. Idiopathic scoliosis and kyphoscoliosis M41.20 737.30   4. Age-related osteoporosis without current pathological fracture M81.0 733.01   5. Non-allergic rhinitis J31.0 472.0     Overall she is doing well but she is gone through 2 major life changes, including the loss of her , and the move from her home to [facility] apartment.  She seems to be coping well, participating with activities and has family support.    No orders of the defined types were placed in this encounter.      Outpatient Encounter Medications as of 3/27/2019   Medication Sig Dispense Refill   • acetaminophen (TYLENOL) 500 MG tablet Take 1,000 " mg by mouth. Takes 2 tablets with breakfast  2 tablets w/supper     • atorvastatin (LIPITOR) 40 MG tablet Take 1 tablet by mouth Daily.     • CALCIUM PO Take 1 tablet by mouth Daily. 2 gummies  Tries to take it daily     • clopidogrel (PLAVIX) 75 MG tablet Take 1 tablet by mouth Daily.     • famotidine (PEPCID) 20 MG tablet Take 1 tablet by mouth Daily.     • lisinopril (PRINIVIL,ZESTRIL) 40 MG tablet Take 1 tablet by mouth Daily. 90 tablet 1   • Multiple Vitamins-Minerals (MULTIVITAMIN ADULT PO) Take 1 tablet by mouth Daily.     • vitamin C (ASCORBIC ACID) 500 MG tablet Take 1 tablet by mouth Daily.     • acetaminophen (TYLENOL 8 HOUR ARTHRITIS PAIN) 650 MG 8 hr tablet Take 1 tablet by mouth Daily.     • PREMARIN 0.625 MG/GM vaginal cream Insert 1 application into the vagina 3 (Three) Times a Week.       No facility-administered encounter medications on file as of 3/27/2019.        Reviewed use of high risk medication in the elderly: yes  Reviewed for potential of harmful drug interactions in the elderly: yes    Follow Up:  No Follow-up on file.     An After Visit Summary and PPPS with all of these plans were given to the patient.

## 2019-04-25 ENCOUNTER — TELEPHONE (OUTPATIENT)
Dept: INTERNAL MEDICINE | Facility: CLINIC | Age: 84
End: 2019-04-25

## 2019-04-25 NOTE — TELEPHONE ENCOUNTER
Patient's daughter called requesting a handicap parking permit form to be completed and she will  at  Friday morning.

## 2019-05-02 ENCOUNTER — OFFICE VISIT (OUTPATIENT)
Dept: INTERNAL MEDICINE | Facility: CLINIC | Age: 84
End: 2019-05-02

## 2019-05-02 VITALS
HEIGHT: 61 IN | WEIGHT: 114 LBS | DIASTOLIC BLOOD PRESSURE: 62 MMHG | SYSTOLIC BLOOD PRESSURE: 146 MMHG | HEART RATE: 80 BPM | BODY MASS INDEX: 21.52 KG/M2

## 2019-05-02 DIAGNOSIS — I10 BENIGN ESSENTIAL HYPERTENSION: Primary | ICD-10-CM

## 2019-05-02 DIAGNOSIS — M25.551 RIGHT HIP PAIN: ICD-10-CM

## 2019-05-02 PROCEDURE — 99213 OFFICE O/P EST LOW 20 MIN: CPT | Performed by: INTERNAL MEDICINE

## 2019-05-02 RX ORDER — AMLODIPINE BESYLATE 5 MG/1
1 TABLET ORAL DAILY
COMMUNITY
Start: 2019-04-25 | End: 2019-05-02 | Stop reason: SDUPTHER

## 2019-05-02 RX ORDER — AMLODIPINE BESYLATE 10 MG/1
10 TABLET ORAL DAILY
Qty: 30 TABLET | Refills: 5 | Status: SHIPPED | OUTPATIENT
Start: 2019-05-02 | End: 2019-11-09 | Stop reason: SDUPTHER

## 2019-05-02 NOTE — PROGRESS NOTES
[facility]     Megan Garcia  1935   [id number]      Patient Care Team:  Nany Rios APRN as PCP - General (Internal Medicine)    Chief Complaint::   Chief Complaint   Patient presents with   • Transitional Care Management   • Hypertension   • Hip Pain        HPI  Mrs. Garcia presented to [facility] emergency room on April 24 with significantly high blood pressure.  Systolic blood pressure was over 200.  She was given IV labetalol which brought her blood pressure down and then started on amlodipine.  Laboratory studies and CT scan of the head were normal.  In March when she was here for her visit, her blood pressure was 164 systolic.  I had asked her at that time to monitor her blood pressure and forward readings.  She had not done that until the 24th and was alarmed at the elevation.  Since last week she is taken amlodipine along with lisinopril, her systolic readings have ranged between 140 and 160.  She has edema but it is no worse than before she started taking amlodipine.  Her only other complaint is right hip pain.  She switch from Aleve, which worked well to Tylenol which does not work at all.    Chronic Conditions:      Patient Active Problem List   Diagnosis   • Allergic rhinitis   • At risk for falls   • Ataxic gait   • Benign essential hypertension   • Idiopathic scoliosis and kyphoscoliosis   • Irritable bowel syndrome with constipation   • Mixed hyperlipidemia   • Non-allergic rhinitis   • Osteoarthritis   • Osteoporosis   • Urinary incontinence        Past Medical History:   Diagnosis Date   • Allergic    • Arthritis    • Hyperlipidemia    • Hypertension    • Intervertebral disc displacement    • Left periventricular lacunar stroke 10/19/2015       Past Surgical History:   Procedure Laterality Date   • LAMINECTOMY  1974   • TOTAL KNEE ARTHROPLASTY Left 2004       Family History   Problem Relation Age of Onset   • Coronary artery disease Father        Social  History     Socioeconomic History   • Marital status:      Spouse name: Not on file   • Number of children: Not on file   • Years of education: Not on file   • Highest education level: Not on file   Tobacco Use   • Smoking status: Never Smoker   • Smokeless tobacco: Never Used   Substance and Sexual Activity   • Alcohol use: No     Frequency: Never   • Drug use: No   • Sexual activity: Defer       No Known Allergies      Current Outpatient Medications:   •  acetaminophen (TYLENOL 8 HOUR ARTHRITIS PAIN) 650 MG 8 hr tablet, Take 1 tablet by mouth Daily., Disp: , Rfl:   •  acetaminophen (TYLENOL) 500 MG tablet, Take 1,000 mg by mouth. Takes 2 tablets with breakfast  2 tablets w/supper, Disp: , Rfl:   •  amLODIPine (NORVASC) 10 MG tablet, Take 1 tablet by mouth Daily., Disp: 30 tablet, Rfl: 5  •  atorvastatin (LIPITOR) 40 MG tablet, Take 1 tablet by mouth Daily., Disp: , Rfl:   •  CALCIUM PO, Take 1 tablet by mouth Daily. 2 gummies  Tries to take it daily, Disp: , Rfl:   •  clopidogrel (PLAVIX) 75 MG tablet, Take 1 tablet by mouth Daily., Disp: , Rfl:   •  famotidine (PEPCID) 20 MG tablet, Take 1 tablet by mouth Daily., Disp: , Rfl:   •  lisinopril (PRINIVIL,ZESTRIL) 40 MG tablet, Take 1 tablet by mouth Daily., Disp: 90 tablet, Rfl: 1  •  Multiple Vitamins-Minerals (MULTIVITAMIN ADULT PO), Take 1 tablet by mouth Daily., Disp: , Rfl:   •  PREMARIN 0.625 MG/GM vaginal cream, Insert 1 application into the vagina 3 (Three) Times a Week., Disp: , Rfl:   •  vitamin C (ASCORBIC ACID) 500 MG tablet, Take 1 tablet by mouth Daily., Disp: , Rfl:     Review of Systems   Constitutional: Negative for chills, fatigue and fever.   HENT: Negative for congestion, ear pain and sinus pressure.    Respiratory: Negative for cough, chest tightness, shortness of breath and wheezing.    Cardiovascular: Negative for chest pain and palpitations.   Gastrointestinal: Negative for abdominal pain, blood in stool and constipation.   Skin:  "Negative for color change.   Allergic/Immunologic: Negative for environmental allergies.   Neurological: Negative for dizziness, speech difficulty and headache.   Psychiatric/Behavioral: Negative for decreased concentration. The patient is not nervous/anxious.         Vital Signs  Vitals:    05/02/19 1148   BP: 146/62   BP Location: Left arm   Patient Position: Sitting   Cuff Size: Adult   Pulse: 80   Weight: 51.7 kg (114 lb)   Height: 154 cm (60.63\")       Physical Exam   Constitutional: She is oriented to person, place, and time. She appears well-developed and well-nourished.   HENT:   Head: Normocephalic and atraumatic.   Cardiovascular: Normal rate, regular rhythm and normal heart sounds.   No murmur heard.  Pulmonary/Chest: Effort normal and breath sounds normal.   Neurological: She is alert and oriented to person, place, and time.   Psychiatric: She has a normal mood and affect.   Vitals reviewed.     Procedures    ACE III MINI             Assessment/Plan:    Megan was seen today for transitional care management, hypertension and hip pain.    Diagnoses and all orders for this visit:    Benign essential hypertension    Right hip pain    Other orders  -     amLODIPine (NORVASC) 10 MG tablet; Take 1 tablet by mouth Daily.    Plan    She will increase amlodipine to 10 mg a day and continue lisinopril at the current dose of 40 mg.  I have asked her to monitor her blood pressure daily some days in the morning some days in the evening.  Goal for her systolic blood pressure is 120-140.  Because of her incontinence, should she need additional blood pressure lowering medication, a diuretic would probably not be a good choice.    She may take Aleve 200 mg twice a day with meals and supplement with Tylenol if needed.  Her renal function is good and she is watching her blood pressure closely.          Plan of care reviewed with patient at the conclusion of today's visit. Education was provided regarding diagnosis, " management, and any prescribed or recommended OTC medications.Patient verbalizes understanding of and agreement with management plan.         Juan Carlos Dunn MD

## 2019-05-02 NOTE — PATIENT INSTRUCTIONS
Increase amlodipine to 10 mg a day.  You may take one alleve twice a day, breakfast and supper.  If needed, you may add one tylenol to each alleve.

## 2019-05-16 ENCOUNTER — OFFICE VISIT (OUTPATIENT)
Dept: INTERNAL MEDICINE | Facility: CLINIC | Age: 84
End: 2019-05-16

## 2019-05-16 VITALS
WEIGHT: 115 LBS | DIASTOLIC BLOOD PRESSURE: 62 MMHG | HEART RATE: 84 BPM | BODY MASS INDEX: 21.71 KG/M2 | SYSTOLIC BLOOD PRESSURE: 142 MMHG | HEIGHT: 61 IN

## 2019-05-16 DIAGNOSIS — I10 BENIGN ESSENTIAL HYPERTENSION: Primary | ICD-10-CM

## 2019-05-16 PROCEDURE — 99213 OFFICE O/P EST LOW 20 MIN: CPT | Performed by: INTERNAL MEDICINE

## 2019-05-16 NOTE — PROGRESS NOTES
[facility]     Megan Garcia  1935   [id number]      Patient Care Team:  Juan Carlos Dunn MD as PCP - General (Internal Medicine)    Chief Complaint::   Chief Complaint   Patient presents with   • Hyperlipidemia   • Hypertension   • Osteoarthritis   • Irritable Bowel Syndrome   • Allergic Rhinitis        HPI  Mrs. Garcia comes in for follow-up of her hypertension.  Blood pressures have remained within normal limits, and she feels much better.  She ambulates with her walker and has not fallen.    Chronic Conditions:      Patient Active Problem List   Diagnosis   • Allergic rhinitis   • At risk for falls   • Ataxic gait   • Benign essential hypertension   • Idiopathic scoliosis and kyphoscoliosis   • Irritable bowel syndrome with constipation   • Mixed hyperlipidemia   • Non-allergic rhinitis   • Osteoarthritis   • Osteoporosis   • Urinary incontinence        Past Medical History:   Diagnosis Date   • Allergic    • Arthritis    • Hyperlipidemia    • Hypertension    • Intervertebral disc displacement    • Left periventricular lacunar stroke 10/19/2015       Past Surgical History:   Procedure Laterality Date   • LAMINECTOMY  1974   • TOTAL KNEE ARTHROPLASTY Left 2004       Family History   Problem Relation Age of Onset   • Coronary artery disease Father        Social History     Socioeconomic History   • Marital status:      Spouse name: Not on file   • Number of children: Not on file   • Years of education: Not on file   • Highest education level: Not on file   Tobacco Use   • Smoking status: Never Smoker   • Smokeless tobacco: Never Used   Substance and Sexual Activity   • Alcohol use: No     Frequency: Never   • Drug use: No   • Sexual activity: Defer       No Known Allergies      Current Outpatient Medications:   •  acetaminophen (TYLENOL) 500 MG tablet, Take 500 mg by mouth 2 (Two) Times a Day. Takes 1 tablet with breakfast  And 1 tablet w/supper, Disp: , Rfl:   •  amLODIPine  "(NORVASC) 10 MG tablet, Take 1 tablet by mouth Daily., Disp: 30 tablet, Rfl: 5  •  atorvastatin (LIPITOR) 40 MG tablet, Take 1 tablet by mouth Daily., Disp: , Rfl:   •  CALCIUM PO, Take 1 tablet by mouth Daily. 2 gummies  Tries to take it daily, Disp: , Rfl:   •  clopidogrel (PLAVIX) 75 MG tablet, Take 1 tablet by mouth Daily., Disp: , Rfl:   •  famotidine (PEPCID) 20 MG tablet, Take 1 tablet by mouth Daily., Disp: , Rfl:   •  lisinopril (PRINIVIL,ZESTRIL) 40 MG tablet, Take 1 tablet by mouth Daily., Disp: 90 tablet, Rfl: 1  •  Multiple Vitamins-Minerals (MULTIVITAMIN ADULT PO), Take 1 tablet by mouth Daily., Disp: , Rfl:   •  PREMARIN 0.625 MG/GM vaginal cream, Insert 1 application into the vagina 3 (Three) Times a Week., Disp: , Rfl:   •  vitamin C (ASCORBIC ACID) 500 MG tablet, Take 1 tablet by mouth Daily., Disp: , Rfl:   •  acetaminophen (TYLENOL 8 HOUR ARTHRITIS PAIN) 650 MG 8 hr tablet, Take 1 tablet by mouth Daily., Disp: , Rfl:     Review of Systems   Constitutional: Negative for chills, fatigue and fever.   HENT: Negative for congestion, ear pain and sinus pressure.    Respiratory: Negative for cough, chest tightness, shortness of breath and wheezing.    Cardiovascular: Negative for chest pain and palpitations.   Gastrointestinal: Negative for abdominal pain, blood in stool and constipation.   Skin: Negative for color change.   Allergic/Immunologic: Negative for environmental allergies.   Neurological: Negative for dizziness, speech difficulty and headache.   Psychiatric/Behavioral: Negative for decreased concentration. The patient is not nervous/anxious.         Vital Signs  Vitals:    05/16/19 1003   BP: 142/62   BP Location: Left arm   Patient Position: Sitting   Cuff Size: Adult   Pulse: 84   Weight: 52.2 kg (115 lb)   Height: 154 cm (60.63\")       Physical Exam   Constitutional: She is oriented to person, place, and time. She appears well-developed and well-nourished.   HENT:   Head: Normocephalic and " atraumatic.   Cardiovascular: Normal rate, regular rhythm and normal heart sounds.   No murmur heard.  Pulmonary/Chest: Effort normal and breath sounds normal.   Neurological: She is alert and oriented to person, place, and time.   Psychiatric: She has a normal mood and affect.   Vitals reviewed.     Procedures    ACE III MINI             Assessment/Plan:    Megan was seen today for hyperlipidemia, hypertension, osteoarthritis, irritable bowel syndrome and allergic rhinitis.    Diagnoses and all orders for this visit:    Benign essential hypertension    Plan    Blood pressure now well controlled with amlodipine 10 mg a day and lisinopril 40 mg a day.  He will continue monitoring blood pressure and remain active.      Plan of care reviewed with patient at the conclusion of today's visit. Education was provided regarding diagnosis, management, and any prescribed or recommended OTC medications.Patient verbalizes understanding of and agreement with management plan.         Juan Carlos Dunn MD

## 2019-05-29 ENCOUNTER — TELEPHONE (OUTPATIENT)
Dept: INTERNAL MEDICINE | Facility: CLINIC | Age: 84
End: 2019-05-29

## 2019-05-29 NOTE — TELEPHONE ENCOUNTER
PATIENT WANTS TO KNOW IF SHE NEEDS ANOTHER BONE DENSITY TEST AGAIN.  SHE SAYS THE CLINIC HAS BEEN CALLING HER THAT SHE IS  ELIGIBLE FOR A FREE TESTING.

## 2019-05-29 NOTE — TELEPHONE ENCOUNTER
Her last one was in 2016, so she will soon be due.  If a free one is offered she should take advantage of that.

## 2019-07-17 ENCOUNTER — HOSPITAL ENCOUNTER (OUTPATIENT)
Dept: BONE DENSITY | Facility: HOSPITAL | Age: 84
Discharge: HOME | End: 2019-07-17
Admitting: NURSE PRACTITIONER

## 2019-07-17 DIAGNOSIS — M81.0 AGE-RELATED OSTEOPOROSIS WITHOUT CURRENT PATHOLOGICAL FRACTURE: ICD-10-CM

## 2019-07-17 PROCEDURE — 77080 DXA BONE DENSITY AXIAL: CPT

## 2019-08-20 ENCOUNTER — TELEPHONE (OUTPATIENT)
Dept: INTERNAL MEDICINE | Facility: CLINIC | Age: 84
End: 2019-08-20

## 2019-08-22 ENCOUNTER — HOSPITAL ENCOUNTER (OUTPATIENT)
Dept: GENERAL RADIOLOGY | Facility: HOSPITAL | Age: 84
Discharge: HOME | End: 2019-08-22
Admitting: INTERNAL MEDICINE

## 2019-08-22 ENCOUNTER — OFFICE VISIT (OUTPATIENT)
Dept: INTERNAL MEDICINE | Facility: CLINIC | Age: 84
End: 2019-08-22

## 2019-08-22 VITALS
DIASTOLIC BLOOD PRESSURE: 64 MMHG | SYSTOLIC BLOOD PRESSURE: 138 MMHG | BODY MASS INDEX: 21.34 KG/M2 | HEART RATE: 96 BPM | WEIGHT: 113 LBS | HEIGHT: 61 IN

## 2019-08-22 DIAGNOSIS — K59.04 CHRONIC IDIOPATHIC CONSTIPATION: ICD-10-CM

## 2019-08-22 DIAGNOSIS — R10.84 GENERALIZED ABDOMINAL PAIN: ICD-10-CM

## 2019-08-22 DIAGNOSIS — R10.84 GENERALIZED ABDOMINAL PAIN: Primary | ICD-10-CM

## 2019-08-22 DIAGNOSIS — K59.1 FUNCTIONAL DIARRHEA: ICD-10-CM

## 2019-08-22 DIAGNOSIS — M81.0 AGE-RELATED OSTEOPOROSIS WITHOUT CURRENT PATHOLOGICAL FRACTURE: ICD-10-CM

## 2019-08-22 PROCEDURE — 99214 OFFICE O/P EST MOD 30 MIN: CPT | Performed by: INTERNAL MEDICINE

## 2019-08-22 PROCEDURE — 74018 RADEX ABDOMEN 1 VIEW: CPT

## 2019-08-22 NOTE — PROGRESS NOTES
[facility]     Megan Garcia  1935   [id number]      Patient Care Team:  Juan Carlos Dunn MD as PCP - General (Internal Medicine)  Juan Carlos Dunn MD as PCP - Claims Attributed    Chief Complaint::   Chief Complaint   Patient presents with   • Constipation   • Diarrhea        HPI  Ms. Garcia comes in complaining of bowel issues.  She states she is miserable.  She has loose stool on some days and has leakage, but several times a month she will have 2 to 3 days of no bowel movement.  She has tried to take Metamucil daily but this was too much causing diarrhea.  She is very much bothered by leakage of stool.  She has to wear a depends.  Adding to her misery is the fact that her apartment has an air conditioner unit in the living room that is supposed to call the bedroom but in the recent extreme heat, her bedroom is too hot.    Chronic Conditions:      Patient Active Problem List   Diagnosis   • Allergic rhinitis   • At risk for falls   • Ataxic gait   • Benign essential hypertension   • Idiopathic scoliosis and kyphoscoliosis   • Irritable bowel syndrome with constipation   • Mixed hyperlipidemia   • Non-allergic rhinitis   • Osteoarthritis   • Osteoporosis   • Urinary incontinence        Past Medical History:   Diagnosis Date   • Allergic    • Arthritis    • Hyperlipidemia    • Hypertension    • Intervertebral disc displacement    • Left periventricular lacunar stroke 10/19/2015       Past Surgical History:   Procedure Laterality Date   • LAMINECTOMY  1974   • TOTAL KNEE ARTHROPLASTY Left 2004       Family History   Problem Relation Age of Onset   • Coronary artery disease Father        Social History     Socioeconomic History   • Marital status:      Spouse name: Not on file   • Number of children: Not on file   • Years of education: Not on file   • Highest education level: Not on file   Tobacco Use   • Smoking status: Never Smoker   • Smokeless tobacco: Never Used    Substance and Sexual Activity   • Alcohol use: No     Frequency: Never   • Drug use: No   • Sexual activity: Defer       No Known Allergies      Current Outpatient Medications:   •  acetaminophen (TYLENOL) 500 MG tablet, Take 500 mg by mouth 2 (Two) Times a Day. Takes 1 tablet with breakfast  And 1 tablet w/supper, Disp: , Rfl:   •  amLODIPine (NORVASC) 10 MG tablet, Take 1 tablet by mouth Daily., Disp: 30 tablet, Rfl: 5  •  atorvastatin (LIPITOR) 40 MG tablet, Take 1 tablet by mouth Daily., Disp: , Rfl:   •  clopidogrel (PLAVIX) 75 MG tablet, Take 1 tablet by mouth Daily., Disp: , Rfl:   •  famotidine (PEPCID) 20 MG tablet, Take 1 tablet by mouth Daily., Disp: , Rfl:   •  lisinopril (PRINIVIL,ZESTRIL) 40 MG tablet, Take 1 tablet by mouth Daily., Disp: 90 tablet, Rfl: 1  •  Multiple Vitamins-Minerals (MULTIVITAMIN ADULT PO), Take 1 tablet by mouth Daily., Disp: , Rfl:   •  acetaminophen (TYLENOL 8 HOUR ARTHRITIS PAIN) 650 MG 8 hr tablet, Take 1 tablet by mouth Daily., Disp: , Rfl:   •  CALCIUM PO, Take 1 tablet by mouth Daily. 2 gummies  Tries to take it daily, Disp: , Rfl:   •  PREMARIN 0.625 MG/GM vaginal cream, Insert 1 application into the vagina 3 (Three) Times a Week., Disp: , Rfl:   •  vitamin C (ASCORBIC ACID) 500 MG tablet, Take 1 tablet by mouth Daily., Disp: , Rfl:     Review of Systems   Constitutional: Negative for chills, fatigue and fever.   HENT: Negative for congestion, ear pain and sinus pressure.    Respiratory: Negative for cough, chest tightness, shortness of breath and wheezing.    Cardiovascular: Negative for chest pain and palpitations.   Gastrointestinal: Positive for constipation and diarrhea. Negative for abdominal pain and blood in stool.   Skin: Negative for color change.   Allergic/Immunologic: Negative for environmental allergies.   Neurological: Negative for dizziness, speech difficulty and headache.   Psychiatric/Behavioral: Negative for decreased concentration. The patient is not  "nervous/anxious.         Vital Signs  Vitals:    08/22/19 1103   BP: 138/64   BP Location: Left arm   Patient Position: Sitting   Cuff Size: Adult   Pulse: 96   Weight: 51.3 kg (113 lb)   Height: 154 cm (60.63\")   PainSc:   6   PainLoc: Back  Comment: hip, knee       Physical Exam   Constitutional: She is oriented to person, place, and time. She appears well-developed and well-nourished.   HENT:   Head: Normocephalic and atraumatic.   Cardiovascular: Normal rate, regular rhythm and normal heart sounds.   No murmur heard.  Pulmonary/Chest: Effort normal and breath sounds normal.   Abdominal: Soft. Bowel sounds are normal. She exhibits no mass. There is no tenderness.   Neurological: She is alert and oriented to person, place, and time.   Psychiatric: She has a normal mood and affect.   Vitals reviewed.     Procedures    ACE III MINI             Assessment/Plan:    Megan was seen today for constipation and diarrhea.    Diagnoses and all orders for this visit:    Generalized abdominal pain  -     XR Abdomen KUB; Future    Chronic idiopathic constipation    Functional diarrhea    Age-related osteoporosis without current pathological fracture    Plan    Abdominal discomfort, alternating diarrhea and constipation.  I am concerned she may have a high constipation which is causing recurrent loose stool.  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  If her KUB is positive for retained stool, would consider using a laxative for a few days versus colonoscopy prep and then referral for colonoscopy.  If it is negative, then I think physical therapy for bowel incontinence would be helpful.  If that is the case will investigate what therapist at [facility], which is convenient for her, do this kind of therapy.    DEXA scan showed significant worsening of her osteoporosis in the right hip.  Once her GI issues are better would consider alendronate.    I spent 25 minutes face-to-face with the patient, 20 minutes of " which were counseling bowel issues and osteoporosis.      Plan of care reviewed with patient at the conclusion of today's visit. Education was provided regarding diagnosis, management, and any prescribed or recommended OTC medications.Patient verbalizes understanding of and agreement with management plan.         Juan Carlos Dnun MD

## 2019-08-26 ENCOUNTER — TELEPHONE (OUTPATIENT)
Dept: INTERNAL MEDICINE | Facility: CLINIC | Age: 84
End: 2019-08-26

## 2019-08-28 ENCOUNTER — TELEPHONE (OUTPATIENT)
Dept: INTERNAL MEDICINE | Facility: CLINIC | Age: 84
End: 2019-08-28

## 2019-08-28 NOTE — TELEPHONE ENCOUNTER
I'm glad she is better.  How much tylenol is she taking for her back?Has she tried any over the counter sleep aids?

## 2019-08-28 NOTE — TELEPHONE ENCOUNTER
PT CALLED STATING THAT EVERYTHING YOU HAD TOLD HER TO DO SHE HAS DONE IT AND SHE SAID IT IS WORKING AND SHE IS VERY PLEASED   SHE ALSO STATED SHE IS STILL HAVING BAD  BACK PAIN AND TROUBLE SLEEPING    HER BP TODAY IS  120/68

## 2019-08-28 NOTE — TELEPHONE ENCOUNTER
I called patient.  She is taking one 325 mg Tylenol and one Aleve BID (breakfast and supper).  She has not tried any OTC sleep aids.  Wonders if she can take a Tylenol at HS, or open to TW suggestions? Patient is leaving now for supper so we can call her on Thursday morning with recommendations.

## 2019-09-09 ENCOUNTER — TELEPHONE (OUTPATIENT)
Dept: INTERNAL MEDICINE | Facility: CLINIC | Age: 84
End: 2019-09-09

## 2019-09-09 DIAGNOSIS — M25.512 ACUTE PAIN OF LEFT SHOULDER: Primary | ICD-10-CM

## 2019-09-09 NOTE — TELEPHONE ENCOUNTER
TIMA FROM [facility] CALLED STATING PT NOTIFIED HER  AND WANTS TO DO PHYSICAL THERAPY  AND OCCUPATIONAL THERAPY ON HER LEFT SHOULDER DUE TO HER TEARING HER BICEP A LITTLE WHILE AGO AND IS STILL HAVING TROUBLE WITH IT

## 2019-10-07 RX ORDER — LISINOPRIL 40 MG/1
40 TABLET ORAL DAILY
Qty: 90 TABLET | Refills: 3 | Status: SHIPPED | OUTPATIENT
Start: 2019-10-07 | End: 2020-09-08

## 2019-10-10 ENCOUNTER — OFFICE VISIT (OUTPATIENT)
Dept: INTERNAL MEDICINE | Facility: CLINIC | Age: 84
End: 2019-10-10

## 2019-10-10 ENCOUNTER — TELEPHONE (OUTPATIENT)
Dept: INTERNAL MEDICINE | Facility: CLINIC | Age: 84
End: 2019-10-10

## 2019-10-10 VITALS
SYSTOLIC BLOOD PRESSURE: 146 MMHG | BODY MASS INDEX: 21.62 KG/M2 | HEIGHT: 61 IN | DIASTOLIC BLOOD PRESSURE: 74 MMHG | WEIGHT: 114.5 LBS | HEART RATE: 84 BPM

## 2019-10-10 DIAGNOSIS — G89.29 CHRONIC RIGHT SHOULDER PAIN: ICD-10-CM

## 2019-10-10 DIAGNOSIS — K62.89 ANAL SPHINCTER INCOMPETENCE: ICD-10-CM

## 2019-10-10 DIAGNOSIS — K59.09 CHRONIC CONSTIPATION: ICD-10-CM

## 2019-10-10 DIAGNOSIS — M16.11 PRIMARY OSTEOARTHRITIS OF RIGHT HIP: Primary | ICD-10-CM

## 2019-10-10 DIAGNOSIS — M25.511 CHRONIC RIGHT SHOULDER PAIN: ICD-10-CM

## 2019-10-10 PROCEDURE — G0008 ADMIN INFLUENZA VIRUS VAC: HCPCS | Performed by: INTERNAL MEDICINE

## 2019-10-10 PROCEDURE — 90653 IIV ADJUVANT VACCINE IM: CPT | Performed by: INTERNAL MEDICINE

## 2019-10-10 PROCEDURE — 99214 OFFICE O/P EST MOD 30 MIN: CPT | Performed by: INTERNAL MEDICINE

## 2019-10-10 RX ORDER — DOCUSATE SODIUM 100 MG/1
100 CAPSULE, LIQUID FILLED ORAL DAILY
COMMUNITY
End: 2019-11-26

## 2019-10-10 NOTE — PROGRESS NOTES
[facility]     Megan Garcia  1935   [id number]      Patient Care Team:  Juan Carlos Dunn MD as PCP - General (Internal Medicine)  Juan Carlos Dunn MD as PCP - Claims Attributed    Chief Complaint::   Chief Complaint   Patient presents with   • Hyperlipidemia   • Hypertension        HPI  Mrs. Garcia comes in for follow-up of her alternating diarrhea and constipation.  She was here about 6 weeks ago.  She also had incontinence of stool when her stool was loose.  KUB x-ray showed retained stool and I recommended that she use a laxative for a few days.  Initially this gave her significant relief, but now she continues to struggle with when to use a laxative.  She continues to have incontinence of stool stating that she cannot control it when she has diarrhea.  She has tried reducing the MiraLAX to every other day and every third day but still at times has diarrhea resulting in incontinence.  Then she takes Imodium.  She also has pain and restriction in the rightshoulder.  Lastly, she complains of pain in the right hip.  She has documented severe osteoarthritis in the hip.  She wants to see Dr. Mora.      Chronic Conditions:      Patient Active Problem List   Diagnosis   • Allergic rhinitis   • At risk for falls   • Ataxic gait   • Benign essential hypertension   • Idiopathic scoliosis and kyphoscoliosis   • Irritable bowel syndrome with constipation   • Mixed hyperlipidemia   • Non-allergic rhinitis   • Osteoarthritis   • Osteoporosis   • Urinary incontinence        Past Medical History:   Diagnosis Date   • Allergic    • Arthritis    • Hyperlipidemia    • Hypertension    • Intervertebral disc displacement    • Left periventricular lacunar stroke 10/19/2015       Past Surgical History:   Procedure Laterality Date   • LAMINECTOMY  1974   • TOTAL KNEE ARTHROPLASTY Left 2004       Family History   Problem Relation Age of Onset   • Coronary artery disease Father        Social  History     Socioeconomic History   • Marital status:      Spouse name: Not on file   • Number of children: Not on file   • Years of education: Not on file   • Highest education level: Not on file   Tobacco Use   • Smoking status: Never Smoker   • Smokeless tobacco: Never Used   Substance and Sexual Activity   • Alcohol use: No     Frequency: Never   • Drug use: No   • Sexual activity: Defer       No Known Allergies      Current Outpatient Medications:   •  acetaminophen (TYLENOL 8 HOUR ARTHRITIS PAIN) 650 MG 8 hr tablet, Take 1 tablet by mouth Daily., Disp: , Rfl:   •  acetaminophen (TYLENOL) 500 MG tablet, Take 500 mg by mouth every night at bedtime. Takes 1 tablet with breakfast  And 1 tablet w/supper, Disp: , Rfl:   •  amLODIPine (NORVASC) 10 MG tablet, Take 1 tablet by mouth Daily., Disp: 30 tablet, Rfl: 5  •  atorvastatin (LIPITOR) 40 MG tablet, Take 1 tablet by mouth Daily., Disp: , Rfl:   •  CALCIUM PO, Take 1 tablet by mouth Daily. 2 gummies  Tries to take it daily, Disp: , Rfl:   •  clopidogrel (PLAVIX) 75 MG tablet, Take 1 tablet by mouth Daily., Disp: , Rfl:   •  docusate sodium (COLACE) 100 MG capsule, Take 100 mg by mouth Daily., Disp: , Rfl:   •  famotidine (PEPCID) 20 MG tablet, Take 1 tablet by mouth Daily., Disp: , Rfl:   •  lisinopril (PRINIVIL,ZESTRIL) 40 MG tablet, TAKE 1 TABLET BY MOUTH  DAILY, Disp: 90 tablet, Rfl: 3  •  Multiple Vitamins-Minerals (MULTIVITAMIN ADULT PO), Take 1 tablet by mouth Daily., Disp: , Rfl:   •  Naproxen Sodium (ALEVE PO), Take 1 tablet by mouth 2 (Two) Times a Day., Disp: , Rfl:   •  vitamin C (ASCORBIC ACID) 500 MG tablet, Take 1 tablet by mouth Daily., Disp: , Rfl:     Review of Systems   Constitutional: Negative for chills, fatigue and fever.   HENT: Negative for congestion, ear pain and sinus pressure.    Respiratory: Negative for cough, chest tightness, shortness of breath and wheezing.    Cardiovascular: Negative for chest pain and palpitations.  "  Gastrointestinal: Positive for constipation. Negative for abdominal pain and blood in stool.   Skin: Negative for color change.   Allergic/Immunologic: Negative for environmental allergies.   Neurological: Negative for dizziness, speech difficulty and headache.   Psychiatric/Behavioral: Negative for decreased concentration. The patient is not nervous/anxious.         Vital Signs  Vitals:    10/10/19 1001   BP: 146/74   BP Location: Left arm   Patient Position: Sitting   Cuff Size: Adult   Pulse: 84  Comment: irregular   Weight: 51.9 kg (114 lb 8 oz)   Height: 154 cm (60.63\")   PainSc:   4   PainLoc: Hip  Comment: right       Physical Exam   Constitutional: She is oriented to person, place, and time. She appears well-developed and well-nourished.   HENT:   Head: Normocephalic and atraumatic.   Cardiovascular: Normal rate, regular rhythm and normal heart sounds.   No murmur heard.  Pulmonary/Chest: Effort normal and breath sounds normal.   Abdominal: Soft. Bowel sounds are normal. There is no tenderness.   Musculoskeletal:   She has significantly reduced range of motion of the right shoulder with discomfort when her elbow approaches the level of the shoulder.   Neurological: She is alert and oriented to person, place, and time.   Psychiatric: She has a normal mood and affect.   Vitals reviewed.     Procedures    ACE III MINI             Assessment/Plan:    Megan was seen today for hyperlipidemia and hypertension.    Diagnoses and all orders for this visit:    Primary osteoarthritis of right hip  -     Ambulatory Referral to Orthopedic Surgery    Anal sphincter incompetence  -     Ambulatory Referral to Physical Therapy Evaluate and treat    Chronic constipation    Chronic right shoulder pain    Other orders  -     Fluad Tri 65yr+    Plan    She is referred to orthopedics for her right hip osteoarthritis.  She realizes that this is certainly not a good time for a major operation but would like to get his " opinion.    She is referred to physical therapy for a laxity of the anal sphincter.  Hopefully this will help her control the loose stool more effectively.    I suggested that she take the MiraLAX only as needed for constipation and not regularly so that she can avoid taking Imodium for diarrhea.    At some point she needs physical therapy for the right shoulder, but does not want to pursue that at present.      Plan of care reviewed with patient at the conclusion of today's visit. Education was provided regarding diagnosis, management, and any prescribed or recommended OTC medications.Patient verbalizes understanding of and agreement with management plan.         Juan Carlos Dunn MD

## 2019-10-10 NOTE — TELEPHONE ENCOUNTER
Called [facility] [phone] and spoke to Nataliia. VO was given to Cyn for PT and OT 9/9/19 and order faxed, but pt said she had never rec'd any services. Nataliia said they do not show where this information was rec'd. Will fax again to [phone]. Pt and Dr. Dunn notified

## 2019-10-11 ENCOUNTER — TELEPHONE (OUTPATIENT)
Dept: INTERNAL MEDICINE | Facility: CLINIC | Age: 84
End: 2019-10-11

## 2019-10-11 NOTE — TELEPHONE ENCOUNTER
Nataliia from [facility] called needing a verbal order for patient's start of care to be Tuesday 10/15/19    Verbal was given

## 2019-10-11 NOTE — TELEPHONE ENCOUNTER
Nataliia from [facility] called stating she had talked to Blanca yesterday 10/10/19 about a referral she is calling to know what the status is    Fax # [phone]

## 2019-10-15 ENCOUNTER — TELEPHONE (OUTPATIENT)
Dept: INTERNAL MEDICINE | Facility: CLINIC | Age: 84
End: 2019-10-15

## 2019-10-15 NOTE — TELEPHONE ENCOUNTER
Physical Therapist from [facility] (Nati [phone]) called and stated that patient had a level 2 interaction between Aleve and Plavix (? Reaction?) during admission to [facility] today.  Tried to call therapist to see what the reaction was but there was no answer.    Also he stated that patient would benefit from having occupational therapy in the home to help getting things out of the closet and using her shoulder.  Need a verbal order to agree with O.T.

## 2019-10-18 ENCOUNTER — TELEPHONE (OUTPATIENT)
Dept: INTERNAL MEDICINE | Facility: CLINIC | Age: 84
End: 2019-10-18

## 2019-10-18 NOTE — TELEPHONE ENCOUNTER
Darien [facility] ([phone]) states patient is have knee problems and would like a referral to ortho Dr. Marrero

## 2019-10-21 NOTE — TELEPHONE ENCOUNTER
Therapist, Luis Daniel Alicia returned call. He said he was calling about the hip. Advised we sent a referral to Dr. Marrero. He will follow up.

## 2019-10-21 NOTE — TELEPHONE ENCOUNTER
Interesting because she asked us for a referral to ortho on the 10th for hip pain.  Referral is pending.

## 2019-11-11 RX ORDER — AMLODIPINE BESYLATE 10 MG/1
TABLET ORAL
Qty: 30 TABLET | Refills: 5 | Status: SHIPPED | OUTPATIENT
Start: 2019-11-11 | End: 2020-05-29 | Stop reason: SDUPTHER

## 2019-11-25 ENCOUNTER — TELEPHONE (OUTPATIENT)
Dept: INTERNAL MEDICINE | Facility: CLINIC | Age: 84
End: 2019-11-25

## 2019-11-25 NOTE — TELEPHONE ENCOUNTER
Pt called today at 11:45 am and said she needs to have a Pre-Op physical. She said the only day she can come in is tomorrow 11/26 after 9:15 am and only wants to see Dr Dunn. She is scheduled to have surgery on 12/10. Can someone help get this scheduled for her?

## 2019-11-26 ENCOUNTER — OFFICE VISIT (OUTPATIENT)
Dept: INTERNAL MEDICINE | Facility: CLINIC | Age: 84
End: 2019-11-26

## 2019-11-26 ENCOUNTER — LAB (OUTPATIENT)
Dept: LAB | Facility: HOSPITAL | Age: 84
End: 2019-11-26

## 2019-11-26 ENCOUNTER — HOSPITAL ENCOUNTER (OUTPATIENT)
Dept: GENERAL RADIOLOGY | Facility: HOSPITAL | Age: 84
Discharge: HOME | End: 2019-11-26
Admitting: INTERNAL MEDICINE

## 2019-11-26 VITALS
WEIGHT: 115 LBS | DIASTOLIC BLOOD PRESSURE: 62 MMHG | HEIGHT: 61 IN | BODY MASS INDEX: 21.71 KG/M2 | HEART RATE: 80 BPM | SYSTOLIC BLOOD PRESSURE: 118 MMHG

## 2019-11-26 DIAGNOSIS — D50.8 OTHER IRON DEFICIENCY ANEMIA: ICD-10-CM

## 2019-11-26 DIAGNOSIS — R73.9 HYPERGLYCEMIA: ICD-10-CM

## 2019-11-26 DIAGNOSIS — I10 BENIGN ESSENTIAL HYPERTENSION: Primary | ICD-10-CM

## 2019-11-26 DIAGNOSIS — I10 BENIGN ESSENTIAL HYPERTENSION: ICD-10-CM

## 2019-11-26 DIAGNOSIS — K58.1 IRRITABLE BOWEL SYNDROME WITH CONSTIPATION: ICD-10-CM

## 2019-11-26 DIAGNOSIS — Z01.818 PREOP TESTING: ICD-10-CM

## 2019-11-26 DIAGNOSIS — R63.4 WEIGHT LOSS: ICD-10-CM

## 2019-11-26 DIAGNOSIS — I67.2 CEREBRAL ATHEROSCLEROSIS: ICD-10-CM

## 2019-11-26 DIAGNOSIS — E78.2 MIXED HYPERLIPIDEMIA: ICD-10-CM

## 2019-11-26 DIAGNOSIS — M41.20 IDIOPATHIC SCOLIOSIS AND KYPHOSCOLIOSIS: ICD-10-CM

## 2019-11-26 DIAGNOSIS — M16.12 PRIMARY OSTEOARTHRITIS OF LEFT HIP: ICD-10-CM

## 2019-11-26 LAB
ALBUMIN SERPL-MCNC: 4.4 G/DL (ref 3.5–5.2)
ALBUMIN/GLOB SERPL: 1.6 G/DL
ALP SERPL-CCNC: 69 U/L (ref 39–117)
ALT SERPL W P-5'-P-CCNC: 12 U/L (ref 1–33)
ANION GAP SERPL CALCULATED.3IONS-SCNC: 15.2 MMOL/L (ref 5–15)
APTT PPP: 32.5 SECONDS (ref 24–37)
AST SERPL-CCNC: 20 U/L (ref 1–32)
BACTERIA UR QL AUTO: NORMAL /HPF
BASOPHILS # BLD AUTO: 0.04 10*3/MM3 (ref 0–0.2)
BASOPHILS NFR BLD AUTO: 0.4 % (ref 0–1.5)
BILIRUB SERPL-MCNC: 0.4 MG/DL (ref 0.2–1.2)
BILIRUB UR QL STRIP: NEGATIVE
BUN BLD-MCNC: 17 MG/DL (ref 8–23)
BUN/CREAT SERPL: 24.6 (ref 7–25)
CALCIUM SPEC-SCNC: 9.7 MG/DL (ref 8.6–10.5)
CHLORIDE SERPL-SCNC: 99 MMOL/L (ref 98–107)
CLARITY UR: CLEAR
CO2 SERPL-SCNC: 21.8 MMOL/L (ref 22–29)
COLOR UR: ABNORMAL
CREAT BLD-MCNC: 0.69 MG/DL (ref 0.57–1)
DEPRECATED RDW RBC AUTO: 42.3 FL (ref 37–54)
EOSINOPHIL # BLD AUTO: 0.07 10*3/MM3 (ref 0–0.4)
EOSINOPHIL NFR BLD AUTO: 0.8 % (ref 0.3–6.2)
ERYTHROCYTE [DISTWIDTH] IN BLOOD BY AUTOMATED COUNT: 12.3 % (ref 12.3–15.4)
GFR SERPL CREATININE-BSD FRML MDRD: 81 ML/MIN/1.73
GLOBULIN UR ELPH-MCNC: 2.8 GM/DL
GLUCOSE BLD-MCNC: 98 MG/DL (ref 65–99)
GLUCOSE UR STRIP-MCNC: NEGATIVE MG/DL
HBA1C MFR BLD: 5.48 % (ref 4.8–5.6)
HCT VFR BLD AUTO: 33.8 % (ref 34–46.6)
HGB BLD-MCNC: 11.9 G/DL (ref 12–15.9)
HGB UR QL STRIP.AUTO: NEGATIVE
HYALINE CASTS UR QL AUTO: NORMAL /LPF
IMM GRANULOCYTES # BLD AUTO: 0.05 10*3/MM3 (ref 0–0.05)
IMM GRANULOCYTES NFR BLD AUTO: 0.5 % (ref 0–0.5)
INR PPP: 1.03 (ref 0.85–1.16)
KETONES UR QL STRIP: NEGATIVE
LEUKOCYTE ESTERASE UR QL STRIP.AUTO: ABNORMAL
LYMPHOCYTES # BLD AUTO: 1.76 10*3/MM3 (ref 0.7–3.1)
LYMPHOCYTES NFR BLD AUTO: 19.2 % (ref 19.6–45.3)
MCH RBC QN AUTO: 33.2 PG (ref 26.6–33)
MCHC RBC AUTO-ENTMCNC: 35.2 G/DL (ref 31.5–35.7)
MCV RBC AUTO: 94.4 FL (ref 79–97)
MONOCYTES # BLD AUTO: 0.75 10*3/MM3 (ref 0.1–0.9)
MONOCYTES NFR BLD AUTO: 8.2 % (ref 5–12)
NEUTROPHILS # BLD AUTO: 6.52 10*3/MM3 (ref 1.7–7)
NEUTROPHILS NFR BLD AUTO: 70.9 % (ref 42.7–76)
NITRITE UR QL STRIP: NEGATIVE
NRBC BLD AUTO-RTO: 0 /100 WBC (ref 0–0.2)
PH UR STRIP.AUTO: 6.5 [PH] (ref 5–8)
PLATELET # BLD AUTO: 370 10*3/MM3 (ref 140–450)
PMV BLD AUTO: 9.8 FL (ref 6–12)
POTASSIUM BLD-SCNC: 4 MMOL/L (ref 3.5–5.2)
PREALB SERPL-MCNC: 21.2 MG/DL (ref 20–40)
PROT SERPL-MCNC: 7.2 G/DL (ref 6–8.5)
PROT UR QL STRIP: NEGATIVE
PROTHROMBIN TIME: 13 SECONDS (ref 11.2–14.3)
RBC # BLD AUTO: 3.58 10*6/MM3 (ref 3.77–5.28)
RBC # UR: NORMAL /HPF
REF LAB TEST METHOD: NORMAL
SODIUM BLD-SCNC: 136 MMOL/L (ref 136–145)
SP GR UR STRIP: 1.02 (ref 1–1.03)
SQUAMOUS #/AREA URNS HPF: NORMAL /HPF
UROBILINOGEN UR QL STRIP: ABNORMAL
WBC NRBC COR # BLD: 9.19 10*3/MM3 (ref 3.4–10.8)
WBC UR QL AUTO: NORMAL /HPF

## 2019-11-26 PROCEDURE — 36415 COLL VENOUS BLD VENIPUNCTURE: CPT

## 2019-11-26 PROCEDURE — 80053 COMPREHEN METABOLIC PANEL: CPT

## 2019-11-26 PROCEDURE — 85025 COMPLETE CBC W/AUTO DIFF WBC: CPT

## 2019-11-26 PROCEDURE — 82728 ASSAY OF FERRITIN: CPT

## 2019-11-26 PROCEDURE — 85730 THROMBOPLASTIN TIME PARTIAL: CPT

## 2019-11-26 PROCEDURE — 84134 ASSAY OF PREALBUMIN: CPT

## 2019-11-26 PROCEDURE — 71046 X-RAY EXAM CHEST 2 VIEWS: CPT

## 2019-11-26 PROCEDURE — 99214 OFFICE O/P EST MOD 30 MIN: CPT | Performed by: INTERNAL MEDICINE

## 2019-11-26 PROCEDURE — 83036 HEMOGLOBIN GLYCOSYLATED A1C: CPT

## 2019-11-26 PROCEDURE — 84466 ASSAY OF TRANSFERRIN: CPT

## 2019-11-26 PROCEDURE — 83540 ASSAY OF IRON: CPT

## 2019-11-26 PROCEDURE — 85610 PROTHROMBIN TIME: CPT

## 2019-11-26 PROCEDURE — 81001 URINALYSIS AUTO W/SCOPE: CPT

## 2019-11-26 NOTE — PROGRESS NOTES
[facility]     Megan Garcia  1935   [id number]      Patient Care Team:  Juan Carlos Dunn MD as PCP - General (Internal Medicine)  Juan Carlos Dunn MD as PCP - Claims Attributed    Chief Complaint::   Chief Complaint   Patient presents with   • Pre-op Exam        HPI  Mrs. Garcia is now 84.  She comes in for follow-up of her hypertension, hyperlipidemia, IBS, scoliosis, and for preoperative evaluation for upcoming hip replacement.  Over the past year she has had increasing pain in debility because of hip pain.  Prior to that she was very spry and active, participating in aerobics and other exercise activities.  Over the past several months she has been more more limited because of hip pain.  This is her only limitation.  There is no chest pain, dyspnea, orthopnea or edema.  She has a remote history of a lacunar stroke several years ago, otherwise has no other history of cardiovascular disease and has had no recurrence.    Chronic Conditions:      Patient Active Problem List   Diagnosis   • Allergic rhinitis   • At risk for falls   • Ataxic gait   • Benign essential hypertension   • Idiopathic scoliosis and kyphoscoliosis   • Irritable bowel syndrome with constipation   • Mixed hyperlipidemia   • Non-allergic rhinitis   • Osteoarthritis   • Osteoporosis   • Urinary incontinence        Past Medical History:   Diagnosis Date   • Allergic    • Arthritis    • Hyperlipidemia    • Hypertension    • Intervertebral disc displacement    • Left periventricular lacunar stroke 10/19/2015       Past Surgical History:   Procedure Laterality Date   • LAMINECTOMY  1974   • TOTAL KNEE ARTHROPLASTY Left 2004       Family History   Problem Relation Age of Onset   • Coronary artery disease Father        Social History     Socioeconomic History   • Marital status:      Spouse name: Not on file   • Number of children: Not on file   • Years of education: Not on file   • Highest education level: Not  on file   Tobacco Use   • Smoking status: Never Smoker   • Smokeless tobacco: Never Used   Substance and Sexual Activity   • Alcohol use: No     Frequency: Never   • Drug use: No   • Sexual activity: Defer       No Known Allergies      Current Outpatient Medications:   •  acetaminophen (TYLENOL 8 HOUR ARTHRITIS PAIN) 650 MG 8 hr tablet, Take 1 tablet by mouth Daily., Disp: , Rfl:   •  acetaminophen (TYLENOL) 500 MG tablet, Take 500 mg by mouth every night at bedtime. Takes 1 tablet with breakfast  And 1 tablet w/supper, Disp: , Rfl:   •  amLODIPine (NORVASC) 10 MG tablet, TAKE ONE TABLET BY MOUTH DAILY, Disp: 30 tablet, Rfl: 5  •  atorvastatin (LIPITOR) 40 MG tablet, Take 1 tablet by mouth Daily., Disp: , Rfl:   •  CALCIUM PO, Take 1 tablet by mouth Daily. 2 gummies  Tries to take it daily, Disp: , Rfl:   •  clopidogrel (PLAVIX) 75 MG tablet, Take 1 tablet by mouth Daily., Disp: , Rfl:   •  famotidine (PEPCID) 20 MG tablet, Take 1 tablet by mouth Daily., Disp: , Rfl:   •  lisinopril (PRINIVIL,ZESTRIL) 40 MG tablet, TAKE 1 TABLET BY MOUTH  DAILY, Disp: 90 tablet, Rfl: 3  •  Multiple Vitamins-Minerals (MULTIVITAMIN ADULT PO), Take 1 tablet by mouth Daily., Disp: , Rfl:   •  vitamin C (ASCORBIC ACID) 500 MG tablet, Take 1 tablet by mouth Daily., Disp: , Rfl:     Review of Systems   Constitutional: Negative for chills, fatigue and fever.   HENT: Negative for congestion, ear pain and sinus pressure.    Respiratory: Negative for cough, chest tightness, shortness of breath and wheezing.    Cardiovascular: Negative for chest pain and palpitations.   Gastrointestinal: Negative for abdominal pain, blood in stool and constipation.   Skin: Negative for color change.   Allergic/Immunologic: Negative for environmental allergies.   Neurological: Negative for dizziness, speech difficulty and headache.   Psychiatric/Behavioral: Negative for decreased concentration. The patient is not nervous/anxious.         Vital Signs  Vitals:     "11/26/19 1156   BP: 118/62   BP Location: Left arm   Patient Position: Sitting   Cuff Size: Adult   Pulse: 80   Weight: 52.2 kg (115 lb)   Height: 154 cm (60.63\")   PainSc:   7   PainLoc: Hip       Physical Exam   Constitutional: She is oriented to person, place, and time. She appears well-developed and well-nourished.   HENT:   Head: Normocephalic and atraumatic.   Neck: Normal range of motion. Neck supple. No JVD present. No tracheal deviation present. No thyromegaly present.   Cardiovascular: Normal rate, regular rhythm and normal heart sounds.   No murmur heard.  Pulmonary/Chest: Effort normal and breath sounds normal.   Abdominal: Soft. Bowel sounds are normal. There is no tenderness.   Musculoskeletal: She exhibits no edema.   Moderate thoracic lumbar kyphoscoliosis   Lymphadenopathy:     She has no cervical adenopathy.   Neurological: She is alert and oriented to person, place, and time. She displays normal reflexes. No cranial nerve deficit or sensory deficit. She exhibits normal muscle tone. Coordination normal.   Skin: Skin is warm and dry.   Psychiatric: She has a normal mood and affect. Her behavior is normal. Judgment and thought content normal.   Vitals reviewed.       ECG 12 Lead  Date/Time: 11/27/2019 9:18 AM  Performed by: Juan Carlos Dunn MD  Authorized by: Juan Carlos Dunn MD   Comparison: not compared with previous ECG   Rhythm: sinus rhythm  Rate: normal  BPM: 82  Conduction: conduction normal  ST Segments: ST segments normal  T Waves: T waves normal  QRS axis: normal    Clinical impression: normal ECG            ACE III MINI             Assessment/Plan:    Megan was seen today for pre-op exam.    Diagnoses and all orders for this visit:    Benign essential hypertension  -     XR Chest PA & Lateral; Future  -     CBC & Differential; Future  -     Comprehensive Metabolic Panel; Future  -     Urinalysis With Culture If Indicated -; Future    Weight loss  -     Prealbumin; " Future    Preop testing  -     Protime-INR; Future  -     aPTT; Future    Hyperglycemia  -     Hemoglobin A1c; Future    Cerebral atherosclerosis  -     Protime-INR; Future  -     aPTT; Future    Mixed hyperlipidemia    Irritable bowel syndrome with constipation    Idiopathic scoliosis and kyphoscoliosis    Primary osteoarthritis of left hip    Plan    Overall she remains in very good health for age.  Her only physical limitation is her hip pain.  She is cleared for surgery.  She is instructed to hold her clopidogrel for 1 week prior to surgery    Blood pressure is well controlled on amlodipine 10 mg a day and lisinopril 40 mg a day.    She remains on aggressive risk factor reduction with control of blood pressure, statin therapy and antiplatelet therapy without current evidence of neurologic dysfunction.    Lipids are well controlled on atorvastatin.    IBS remains a problem with recurrent alternating diarrhea and constipation.  She continues to manage this symptomatically.    Scoliosis is not limiting.    Addendum:  Labs reviewed, only significant finding is hemoglobin is 11.9, down from 31.1 previously.  Ferritin and iron are normal, although iron saturation is low.  She is instructed to take OTC iron until surgery.        Plan of care reviewed with patient at the conclusion of today's visit. Education was provided regarding diagnosis, management, and any prescribed or recommended OTC medications.Patient verbalizes understanding of and agreement with management plan.         Juan Carlos Dunn MD

## 2019-11-27 ENCOUNTER — TELEPHONE (OUTPATIENT)
Dept: INTERNAL MEDICINE | Facility: CLINIC | Age: 84
End: 2019-11-27

## 2019-11-27 ENCOUNTER — LAB (OUTPATIENT)
Dept: LAB | Facility: HOSPITAL | Age: 84
End: 2019-11-27

## 2019-11-27 DIAGNOSIS — N30.00 ACUTE CYSTITIS WITHOUT HEMATURIA: Primary | ICD-10-CM

## 2019-11-27 DIAGNOSIS — D51.8 OTHER VITAMIN B12 DEFICIENCY ANEMIA: ICD-10-CM

## 2019-11-27 DIAGNOSIS — D50.8 OTHER IRON DEFICIENCY ANEMIA: Primary | ICD-10-CM

## 2019-11-27 DIAGNOSIS — D51.8 OTHER VITAMIN B12 DEFICIENCY ANEMIA: Primary | ICD-10-CM

## 2019-11-27 LAB
FERRITIN SERPL-MCNC: 81.4 NG/ML (ref 13–150)
FOLATE SERPL-MCNC: >20 NG/ML (ref 4.78–24.2)
IRON 24H UR-MRATE: 38 MCG/DL (ref 37–145)
IRON SATN MFR SERPL: 10 % (ref 20–50)
TIBC SERPL-MCNC: 362 MCG/DL (ref 298–536)
TRANSFERRIN SERPL-MCNC: 243 MG/DL (ref 200–360)
VIT B12 BLD-MCNC: 595 PG/ML (ref 211–946)

## 2019-11-27 PROCEDURE — 82746 ASSAY OF FOLIC ACID SERUM: CPT

## 2019-11-27 PROCEDURE — 93000 ELECTROCARDIOGRAM COMPLETE: CPT | Performed by: INTERNAL MEDICINE

## 2019-11-27 PROCEDURE — 82607 VITAMIN B-12: CPT

## 2019-11-27 NOTE — TELEPHONE ENCOUNTER
Per Dr. Dunn pt is a little anemic and needs iron and ferritin level checked, Called lab and they are unable to add on to labs yesterday. Called pt and she will try get here next week

## 2019-12-04 ENCOUNTER — TELEPHONE (OUTPATIENT)
Dept: INTERNAL MEDICINE | Facility: CLINIC | Age: 84
End: 2019-12-04

## 2019-12-04 NOTE — TELEPHONE ENCOUNTER
Pt called and stated that when she was in last she had to leave a pre-op form with Dr. Dunn because she had to do blood work and an xray.   Pt has a pre hospital visit in the morning at 8am. She needs that form to be faxed to Dr. Jin's office before her visit in the morning. Pt is very worried about finding this form because her surgery is scheduled for Dec.10th and she does not want her surgery to be put off because of the form. Please call pt back today as she is rather frantic over the form.

## 2019-12-04 NOTE — TELEPHONE ENCOUNTER
Called Dr. Marrero's office and medical records stated that they received the pre op form, notes and labs that were faxed on the 27th. I called the patient to let her know that she had no worries, and that they had all the paperwork. She was very relieved.

## 2019-12-05 ENCOUNTER — TELEPHONE (OUTPATIENT)
Dept: INTERNAL MEDICINE | Facility: CLINIC | Age: 84
End: 2019-12-05

## 2019-12-05 NOTE — TELEPHONE ENCOUNTER
Pt called asked if everything ECG and chest xrays  Sent  To Dr. Mora office also along with [facility]    Please advise and call back

## 2019-12-05 NOTE — TELEPHONE ENCOUNTER
Fabiana with [facility] called and stated that they need the ECG and chest x-ray reports from 11/26/19. This needs to be sent over today.    Fax# is [phone] attmalissa Jung.

## 2019-12-11 ENCOUNTER — TELEPHONE (OUTPATIENT)
Dept: INTERNAL MEDICINE | Facility: CLINIC | Age: 84
End: 2019-12-11

## 2019-12-11 NOTE — TELEPHONE ENCOUNTER
LIZETTE FROM [facility] CALLING WITH DONY REGARDING THE PATIENT'S clopidogrel (PLAVIX) 75 MG tablet. HE STATES THAT PATIENT RECENTLY HAD A TOTAL HIP PROCEDURE AND WHEN GOING OVER THE MEDICATIONS WITH THE PT HAD SAID THAT DR GILBERT WAS TRYING TO WEAN HER OFF OF HER PLAVIX BUT SHE WAS UNSURE AS TO WHY. THEY ARE REQUESTING A CALL BACK TO DISCUSS WHY SHE IS BEING WEANED OFF OF THIS SO THAT THEY CAN WORK ON HER POST-OP MEDICAITONS. PLEASE ADVISE.    LIZETTE CAN BE REACHED [phone]  OR DIRECT PHARMACY PHONE [phone]

## 2019-12-11 NOTE — TELEPHONE ENCOUNTER
No, she was only told to hold it 7 days prior to surgery, then resume whenever surgeon thought appropriate.

## 2019-12-30 ENCOUNTER — TELEPHONE (OUTPATIENT)
Dept: INTERNAL MEDICINE | Facility: CLINIC | Age: 84
End: 2019-12-30

## 2019-12-30 NOTE — TELEPHONE ENCOUNTER
Patient was being discharged from the [facility] was curious if dr martinez was her pcp  (already confirmed) and if he would be following her home health orders please advise and call Nataliia back [phone]

## 2020-01-02 ENCOUNTER — TELEPHONE (OUTPATIENT)
Dept: INTERNAL MEDICINE | Facility: CLINIC | Age: 85
End: 2020-01-02

## 2020-01-02 NOTE — TELEPHONE ENCOUNTER
Cyn from [facility] called about the patient, Megan Garcia, stating that she just got out of the [facility] after Dr. Marrero did a hip on her. Cyn said that she does not know why home health was ordered for the patient, as she does not believe that the patient has any nursing needs. Cyn said that she visited the patient and put in for one more nursing visit with the patient in 2 weeks just to evaluate pain and check on her. Cyn said that she just wanted to let Dr. Dunn know this and inquire if there have been any medication changes with the patient or anything else that she needs to know about. Cyn requested a call back at [phone] when this message has been received. Cyn said that if she is unable to answer, it is likely because she is with a patient, but a detailed voicemail can be left at this phone number as it is a secure line.

## 2020-01-02 NOTE — TELEPHONE ENCOUNTER
Called and spoke with Cyn. Advised we have not seen patient since she has had surgery. We last saw her for preop visit. She is going to call Dr. Mora office to see if there is any info she should know about prior to seeing the patient again.

## 2020-01-07 ENCOUNTER — TELEPHONE (OUTPATIENT)
Dept: INTERNAL MEDICINE | Facility: CLINIC | Age: 85
End: 2020-01-07

## 2020-01-07 NOTE — TELEPHONE ENCOUNTER
Pt was d/c'd from [facility] at [facility] 12/30/19. Received notification of d/c today, 1/7/2020.  TCM not applicable due to late notification.  Does have hospital f/u appt with Dr. Dunn 1/9/2020.

## 2020-01-07 NOTE — TELEPHONE ENCOUNTER
EVELYN FROM HOME HEALTH CALLED WANTING DR GILBERT TO KNOW THAT THEY ARE SENDING OVER A NURSE ON 1/07/2020 FOR A SPOT ON HER LEG.

## 2020-01-07 NOTE — TELEPHONE ENCOUNTER
"--- Message from Gaurav Henderson sent at 1/7/2020  2:31 PM EST -----  Contact: PATIENT  PT WAS ADMITTED TO [facility] AND HAD A RT HIP REPLACEMENT ON 01/10/2020. SHE WAS D/C'D ON 01/13/2020 TO \"THE [facility]\" FOR REHAB. SURG WAS PERFORMED BY DR. MARIN.     PT WAS UNSURE OF THE DATE OF WHEN SHE WAS D/C'D FROM THE [facility]. IN HER EXACT WORDS, \"IT WAS ON A Monday BUT AFTER CHRISTMAS\" I WAS GUESSING THE 30TH BUT IM NOT SURE. I CALLED THE [facility] TO REQUEST RECORDS AND ASK DATE OF D/C. THAT WAS THIS MORNING AND STILL HAVE NOT RECEIVED ANYTHING. I ALSO FAXED OVER A REQUEST FOR MEDICAL RECORDS AT [facility] BUT STILL HAVEN'T RECEIVED IT. SHE HAS AN APT W/ DR. GILBERT 01/09/2020 AT 12:45AM    "

## 2020-01-07 NOTE — TELEPHONE ENCOUNTER
"----- Message from Gaurav Henderson sent at 1/7/2020  2:31 PM EST -----  Contact: PATIENT  PT WAS ADMITTED TO [facility] AND HAD A RT HIP REPLACEMENT ON 01/10/2020. SHE WAS D/C'D ON 01/13/2020 TO \"THE [facility]\""\" FOR REHAB. SURG WAS PERFORMED BY DR. MARIN.     PT WAS UNSURE OF THE DATE OF WHEN SHE WAS D/C'D FROM THE [facility]\"". IN HER EXACT WORDS, \"IT WAS ON A Monday BUT AFTER CHRISTMAS\" I WAS GUESSING THE 30TH BUT IM NOT SURE. I CALLED THE [facility] TO REQUEST RECORDS AND ASK DATE OF D/C. THAT WAS THIS MORNING AND STILL HAVE NOT RECEIVED ANYTHING. I ALSO FAXED OVER A REQUEST FOR MEDICAL RECORDS AT [facility] BUT STILL HAVEN'T RECEIVED IT. SHE HAS AN APT W/ DR. GILBERT 01/09/2020 AT 12:45AM  "

## 2020-01-09 ENCOUNTER — OFFICE VISIT (OUTPATIENT)
Dept: INTERNAL MEDICINE | Facility: CLINIC | Age: 85
End: 2020-01-09

## 2020-01-09 ENCOUNTER — LAB (OUTPATIENT)
Dept: LAB | Facility: HOSPITAL | Age: 85
End: 2020-01-09

## 2020-01-09 VITALS
BODY MASS INDEX: 22.66 KG/M2 | WEIGHT: 120 LBS | HEART RATE: 72 BPM | HEIGHT: 61 IN | DIASTOLIC BLOOD PRESSURE: 62 MMHG | SYSTOLIC BLOOD PRESSURE: 132 MMHG

## 2020-01-09 DIAGNOSIS — I10 BENIGN ESSENTIAL HYPERTENSION: Primary | ICD-10-CM

## 2020-01-09 DIAGNOSIS — I10 BENIGN ESSENTIAL HYPERTENSION: ICD-10-CM

## 2020-01-09 DIAGNOSIS — K58.1 IRRITABLE BOWEL SYNDROME WITH CONSTIPATION: ICD-10-CM

## 2020-01-09 DIAGNOSIS — M16.11 PRIMARY OSTEOARTHRITIS OF RIGHT HIP: ICD-10-CM

## 2020-01-09 DIAGNOSIS — M41.20 IDIOPATHIC SCOLIOSIS AND KYPHOSCOLIOSIS: ICD-10-CM

## 2020-01-09 LAB
ANION GAP SERPL CALCULATED.3IONS-SCNC: 12.2 MMOL/L (ref 5–15)
BASOPHILS # BLD AUTO: 0.06 10*3/MM3 (ref 0–0.2)
BASOPHILS NFR BLD AUTO: 0.8 % (ref 0–1.5)
BUN BLD-MCNC: 11 MG/DL (ref 8–23)
BUN/CREAT SERPL: 15.7 (ref 7–25)
CALCIUM SPEC-SCNC: 9.5 MG/DL (ref 8.6–10.5)
CHLORIDE SERPL-SCNC: 98 MMOL/L (ref 98–107)
CO2 SERPL-SCNC: 24.8 MMOL/L (ref 22–29)
CREAT BLD-MCNC: 0.7 MG/DL (ref 0.57–1)
DEPRECATED RDW RBC AUTO: 43.7 FL (ref 37–54)
EOSINOPHIL # BLD AUTO: 0.18 10*3/MM3 (ref 0–0.4)
EOSINOPHIL NFR BLD AUTO: 2.3 % (ref 0.3–6.2)
ERYTHROCYTE [DISTWIDTH] IN BLOOD BY AUTOMATED COUNT: 13 % (ref 12.3–15.4)
GFR SERPL CREATININE-BSD FRML MDRD: 80 ML/MIN/1.73
GLUCOSE BLD-MCNC: 96 MG/DL (ref 65–99)
HCT VFR BLD AUTO: 33 % (ref 34–46.6)
HGB BLD-MCNC: 11.1 G/DL (ref 12–15.9)
IMM GRANULOCYTES # BLD AUTO: 0.03 10*3/MM3 (ref 0–0.05)
IMM GRANULOCYTES NFR BLD AUTO: 0.4 % (ref 0–0.5)
LYMPHOCYTES # BLD AUTO: 1.52 10*3/MM3 (ref 0.7–3.1)
LYMPHOCYTES NFR BLD AUTO: 19.1 % (ref 19.6–45.3)
MCH RBC QN AUTO: 31.4 PG (ref 26.6–33)
MCHC RBC AUTO-ENTMCNC: 33.6 G/DL (ref 31.5–35.7)
MCV RBC AUTO: 93.5 FL (ref 79–97)
MONOCYTES # BLD AUTO: 0.84 10*3/MM3 (ref 0.1–0.9)
MONOCYTES NFR BLD AUTO: 10.6 % (ref 5–12)
NEUTROPHILS # BLD AUTO: 5.33 10*3/MM3 (ref 1.7–7)
NEUTROPHILS NFR BLD AUTO: 66.8 % (ref 42.7–76)
NRBC BLD AUTO-RTO: 0 /100 WBC (ref 0–0.2)
PLATELET # BLD AUTO: 359 10*3/MM3 (ref 140–450)
PMV BLD AUTO: 9.4 FL (ref 6–12)
POTASSIUM BLD-SCNC: 4 MMOL/L (ref 3.5–5.2)
RBC # BLD AUTO: 3.53 10*6/MM3 (ref 3.77–5.28)
SODIUM BLD-SCNC: 135 MMOL/L (ref 136–145)
WBC NRBC COR # BLD: 7.96 10*3/MM3 (ref 3.4–10.8)

## 2020-01-09 PROCEDURE — 85025 COMPLETE CBC W/AUTO DIFF WBC: CPT

## 2020-01-09 PROCEDURE — 99214 OFFICE O/P EST MOD 30 MIN: CPT | Performed by: INTERNAL MEDICINE

## 2020-01-09 PROCEDURE — 80048 BASIC METABOLIC PNL TOTAL CA: CPT

## 2020-01-09 RX ORDER — FERROUS SULFATE 325(65) MG
325 TABLET ORAL
COMMUNITY
End: 2020-05-29

## 2020-01-09 RX ORDER — DOCUSATE SODIUM 100 MG/1
100 CAPSULE, LIQUID FILLED ORAL DAILY
COMMUNITY
Start: 2019-12-13 | End: 2021-08-02

## 2020-01-09 RX ORDER — CETIRIZINE HYDROCHLORIDE 10 MG/1
10 TABLET ORAL DAILY
COMMUNITY
Start: 2015-10-20 | End: 2020-05-29

## 2020-01-09 NOTE — PROGRESS NOTES
[facility]     Megan Garcia  1935   [id number]      Patient Care Team:  Juan Carlos Dunn MD as PCP - General (Internal Medicine)    Chief Complaint::   Chief Complaint   Patient presents with   • Hospital follow up     Surg 12/10  Dr. Cameron Noe from rehab 12/30        HPI  Ms. Garcia comes in for follow-up of her hypertension, irritable bowel syndrome, scoliosis and osteoarthritis.  She underwent right hip replacement by Dr. Mora on 10 December.  This went very well and she was subsequently transferred to rehab where she stayed until the 30th.  She has been back in her apartment since then.  She is doing extraordinarily well.  She has no hip pain.  She is now again walking to the dining room for her meals without problems using a walker.  When I saw her preoperatively she was in a wheelchair.  Although she still has scoliosis and some back pain she can stand much straighter because of the new hip.  She is actually delighted with how well she is done.    Chronic Conditions:      Patient Active Problem List   Diagnosis   • Allergic rhinitis   • At risk for falls   • Ataxic gait   • Benign essential hypertension   • Idiopathic scoliosis and kyphoscoliosis   • Irritable bowel syndrome with constipation   • Mixed hyperlipidemia   • Non-allergic rhinitis   • Osteoarthritis   • Osteoporosis   • Urinary incontinence        Past Medical History:   Diagnosis Date   • Allergic    • Arthritis    • Hyperlipidemia    • Hypertension    • Intervertebral disc displacement    • Left periventricular lacunar stroke 10/19/2015       Past Surgical History:   Procedure Laterality Date   • LAMINECTOMY  1974   • TOTAL KNEE ARTHROPLASTY Left 2004       Family History   Problem Relation Age of Onset   • Coronary artery disease Father        Social History     Socioeconomic History   • Marital status:      Spouse name: Not on file   • Number of children: Not on file   • Years of education:  Not on file   • Highest education level: Not on file   Tobacco Use   • Smoking status: Never Smoker   • Smokeless tobacco: Never Used   Substance and Sexual Activity   • Alcohol use: No     Frequency: Never   • Drug use: No   • Sexual activity: Defer       No Known Allergies      Current Outpatient Medications:   •  acetaminophen (TYLENOL 8 HOUR ARTHRITIS PAIN) 650 MG 8 hr tablet, Take 1 tablet by mouth Daily., Disp: , Rfl:   •  acetaminophen (TYLENOL) 500 MG tablet, Take 500 mg by mouth every night at bedtime. Takes 1 tablet with breakfast  And 1 tablet w/supper, Disp: , Rfl:   •  amLODIPine (NORVASC) 10 MG tablet, TAKE ONE TABLET BY MOUTH DAILY, Disp: 30 tablet, Rfl: 5  •  ASPIRIN 81 PO, Take 81 mg by mouth Daily., Disp: , Rfl:   •  atorvastatin (LIPITOR) 40 MG tablet, Take 1 tablet by mouth Daily., Disp: , Rfl:   •  CALCIUM PO, Take 1 tablet by mouth Daily. 2 gummies  Tries to take it daily, Disp: , Rfl:   •  cetirizine (zyrTEC) 10 MG tablet, Take 10 mg by mouth Daily., Disp: , Rfl:   •  clopidogrel (PLAVIX) 75 MG tablet, Take 1 tablet by mouth Daily., Disp: , Rfl:   •  docusate sodium (COLACE) 100 MG capsule, Take 100 mg by mouth Daily., Disp: , Rfl:   •  famotidine (PEPCID) 20 MG tablet, Take 1 tablet by mouth Daily., Disp: , Rfl:   •  ferrous sulfate 325 (65 FE) MG tablet, Take 325 mg by mouth Daily With Breakfast., Disp: , Rfl:   •  lisinopril (PRINIVIL,ZESTRIL) 40 MG tablet, TAKE 1 TABLET BY MOUTH  DAILY, Disp: 90 tablet, Rfl: 3  •  Multiple Vitamins-Minerals (MULTIVITAMIN ADULT PO), Take 1 tablet by mouth Daily., Disp: , Rfl:   •  vitamin C (ASCORBIC ACID) 500 MG tablet, Take 1 tablet by mouth Daily., Disp: , Rfl:     Review of Systems   Constitutional: Negative for chills, fatigue and fever.   HENT: Negative for congestion, ear pain and sinus pressure.    Respiratory: Negative for cough, chest tightness, shortness of breath and wheezing.    Cardiovascular: Negative for chest pain and palpitations.  "  Gastrointestinal: Negative for abdominal pain, blood in stool and constipation.   Skin: Negative for color change.   Allergic/Immunologic: Negative for environmental allergies.   Neurological: Negative for dizziness, speech difficulty and headache.   Psychiatric/Behavioral: Negative for decreased concentration. The patient is not nervous/anxious.         Vital Signs  Vitals:    01/09/20 1244   BP: 132/62   BP Location: Left arm   Patient Position: Sitting   Cuff Size: Adult   Pulse: 72   Weight: 54.4 kg (120 lb)   Height: 154 cm (60.63\")   PainSc: 0-No pain       Physical Exam   Constitutional: She is oriented to person, place, and time. She appears well-developed and well-nourished.   HENT:   Head: Normocephalic and atraumatic.   Cardiovascular: Normal rate, regular rhythm and normal heart sounds.   No murmur heard.  Pulmonary/Chest: Effort normal and breath sounds normal.   Neurological: She is alert and oriented to person, place, and time.   Right hip anterior surgical scar is well-healed.   Psychiatric: She has a normal mood and affect.   Vitals reviewed.     Procedures    ACE III MINI             Assessment/Plan:    Megan was seen today for hospital follow up.    Diagnoses and all orders for this visit:    Benign essential hypertension  -     CBC & Differential; Future  -     Basic Metabolic Panel; Future    Irritable bowel syndrome with constipation    Idiopathic scoliosis and kyphoscoliosis    Primary osteoarthritis of right hip     Plan    Blood pressure is well controlled on lisinopril.    IBS remains controlled with current regimen.    Scoliosis is stable.    She is done extraordinarily well following hip replacement.  CBC is obtained today to follow-up postop anemia but she is certainly asymptomatic.  She will follow-up with Dr. Mora is scheduled.      Plan of care reviewed with patient at the conclusion of today's visit. Education was provided regarding diagnosis, management, and any prescribed " or recommended OTC medications.Patient verbalizes understanding of and agreement with management plan.         Juan Carlos Dunn MD

## 2020-01-16 ENCOUNTER — TELEPHONE (OUTPATIENT)
Dept: INTERNAL MEDICINE | Facility: CLINIC | Age: 85
End: 2020-01-16

## 2020-01-16 NOTE — TELEPHONE ENCOUNTER
KARLY FROM [facility] CALLED NEEDING VERBAL ORDERS. KARLY IS WANTING ORDERS FOR MORE VISITS DUE TO PT'S CURRENT KNEE PROBLEM.

## 2020-01-23 ENCOUNTER — TELEPHONE (OUTPATIENT)
Dept: INTERNAL MEDICINE | Facility: CLINIC | Age: 85
End: 2020-01-23

## 2020-01-23 NOTE — TELEPHONE ENCOUNTER
Pt called wanting the results of her last labs. Pt wants to come off the iron pills so needs to know if she is still anemic. Pt would like someone to give her a call to advise. She stated the results were supposed to be sent to Dr. Severino but did not happen according to patient.

## 2020-01-31 ENCOUNTER — TELEPHONE (OUTPATIENT)
Dept: INTERNAL MEDICINE | Facility: CLINIC | Age: 85
End: 2020-01-31

## 2020-02-03 RX ORDER — CLOPIDOGREL BISULFATE 75 MG/1
75 TABLET ORAL DAILY
Qty: 90 TABLET | Refills: 1 | Status: SHIPPED | OUTPATIENT
Start: 2020-02-03 | End: 2020-07-27

## 2020-02-03 RX ORDER — ATORVASTATIN CALCIUM 40 MG/1
40 TABLET, FILM COATED ORAL DAILY
Qty: 90 TABLET | Refills: 1 | Status: SHIPPED | OUTPATIENT
Start: 2020-02-03 | End: 2020-02-13 | Stop reason: SDUPTHER

## 2020-02-05 RX ORDER — FAMOTIDINE 20 MG/1
20 TABLET, FILM COATED ORAL DAILY
Qty: 90 TABLET | Refills: 1 | Status: SHIPPED | OUTPATIENT
Start: 2020-02-05 | End: 2020-07-27

## 2020-02-13 ENCOUNTER — OFFICE VISIT (OUTPATIENT)
Dept: INTERNAL MEDICINE | Facility: CLINIC | Age: 85
End: 2020-02-13

## 2020-02-13 VITALS
WEIGHT: 120 LBS | HEIGHT: 61 IN | HEART RATE: 74 BPM | BODY MASS INDEX: 22.66 KG/M2 | DIASTOLIC BLOOD PRESSURE: 72 MMHG | TEMPERATURE: 98.1 F | SYSTOLIC BLOOD PRESSURE: 118 MMHG

## 2020-02-13 DIAGNOSIS — M16.11 PRIMARY OSTEOARTHRITIS OF RIGHT HIP: ICD-10-CM

## 2020-02-13 DIAGNOSIS — M17.0 PRIMARY OSTEOARTHRITIS OF BOTH KNEES: Primary | ICD-10-CM

## 2020-02-13 DIAGNOSIS — M41.20 IDIOPATHIC SCOLIOSIS AND KYPHOSCOLIOSIS: ICD-10-CM

## 2020-02-13 DIAGNOSIS — R29.898 WEAKNESS OF BOTH LOWER EXTREMITIES: ICD-10-CM

## 2020-02-13 PROCEDURE — 99214 OFFICE O/P EST MOD 30 MIN: CPT | Performed by: INTERNAL MEDICINE

## 2020-02-13 RX ORDER — ATORVASTATIN CALCIUM 20 MG/1
20 TABLET, FILM COATED ORAL DAILY
Qty: 90 TABLET | Refills: 3 | Status: SHIPPED | OUTPATIENT
Start: 2020-02-13 | End: 2021-03-08 | Stop reason: SDUPTHER

## 2020-02-13 NOTE — PROGRESS NOTES
[facility]     Megan Garcia  1935   [id number]      Patient Care Team:  Juan Carlos Dunn MD as PCP - General (Internal Medicine)  Juan Carlos Dunn MD as PCP - Claims Attributed    Chief Complaint::   Chief Complaint   Patient presents with   • wheelchair     wanting motorized scooter        HPI  Ms. Garcia comes in for mobility evaluation.  She underwent right hip replacement by Dr. Mora on December 10 of last year.  She did very well and had been back [facility].  She was participating with physical therapy and doing well.  1 month ago she fell asleep in a recliner with her legs in a dependent position between the foot rest in the chair.  When she woke up she could not walk.  She has been back to orthopedics.  Her right knee exhibited significant swelling which improved after an injection but still she was not ambulatory.  She has made some progress since then but still can take only a few steps with the help of her walker.  She is unable to propel a manual wheelchair because of weakness in her upper extremities.  She currently needs assistance with all activities of daily living.  She is unable to get to the dining room for meals.    Chronic Conditions:      Patient Active Problem List   Diagnosis   • Allergic rhinitis   • At risk for falls   • Ataxic gait   • Benign essential hypertension   • Idiopathic scoliosis and kyphoscoliosis   • Irritable bowel syndrome with constipation   • Mixed hyperlipidemia   • Non-allergic rhinitis   • Osteoarthritis   • Osteoporosis   • Urinary incontinence   • Primary osteoarthritis of both knees   • Primary osteoarthritis of right hip        Past Medical History:   Diagnosis Date   • Allergic    • Arthritis    • Hyperlipidemia    • Hypertension    • Intervertebral disc displacement    • Left periventricular lacunar stroke 10/19/2015       Past Surgical History:   Procedure Laterality Date   • LAMINECTOMY  1974    • TOTAL KNEE ARTHROPLASTY Left 2004       Family History   Problem Relation Age of Onset   • Coronary artery disease Father        Social History     Socioeconomic History   • Marital status:      Spouse name: Not on file   • Number of children: Not on file   • Years of education: Not on file   • Highest education level: Not on file   Tobacco Use   • Smoking status: Never Smoker   • Smokeless tobacco: Never Used   Substance and Sexual Activity   • Alcohol use: No     Frequency: Never   • Drug use: No   • Sexual activity: Defer       No Known Allergies      Current Outpatient Medications:   •  acetaminophen (TYLENOL 8 HOUR ARTHRITIS PAIN) 650 MG 8 hr tablet, Take 1 tablet by mouth Daily., Disp: , Rfl:   •  acetaminophen (TYLENOL) 500 MG tablet, Take 500 mg by mouth every night at bedtime. Takes 1 tablet with breakfast  And 1 tablet w/supper, Disp: , Rfl:   •  amLODIPine (NORVASC) 10 MG tablet, TAKE ONE TABLET BY MOUTH DAILY, Disp: 30 tablet, Rfl: 5  •  ASPIRIN 81 PO, Take 81 mg by mouth Daily., Disp: , Rfl:   •  atorvastatin (LIPITOR) 20 MG tablet, Take 1 tablet by mouth Daily., Disp: 90 tablet, Rfl: 3  •  CALCIUM PO, Take 1 tablet by mouth Daily. 2 gummies  Tries to take it daily, Disp: , Rfl:   •  cetirizine (zyrTEC) 10 MG tablet, Take 10 mg by mouth Daily., Disp: , Rfl:   •  clopidogrel (PLAVIX) 75 MG tablet, Take 1 tablet by mouth Daily., Disp: 90 tablet, Rfl: 1  •  docusate sodium (COLACE) 100 MG capsule, Take 100 mg by mouth Daily., Disp: , Rfl:   •  famotidine (PEPCID) 20 MG tablet, Take 1 tablet by mouth Daily., Disp: 90 tablet, Rfl: 1  •  ferrous sulfate 325 (65 FE) MG tablet, Take 325 mg by mouth Daily With Breakfast., Disp: , Rfl:   •  lisinopril (PRINIVIL,ZESTRIL) 40 MG tablet, TAKE 1 TABLET BY MOUTH  DAILY, Disp: 90 tablet, Rfl: 3  •  Multiple Vitamins-Minerals (MULTIVITAMIN ADULT PO), Take 1 tablet by mouth Daily., Disp: , Rfl:   •  vitamin C (ASCORBIC ACID) 500 MG tablet, Take 1 tablet by  "mouth Daily., Disp: , Rfl:     Review of Systems     Vital Signs  Vitals:    02/13/20 1418   BP: 118/72   Pulse: 74   Temp: 98.1 °F (36.7 °C)   Weight: 54.4 kg (120 lb)   Height: 154 cm (60.63\")       Physical Exam   Constitutional: She is oriented to person, place, and time. She appears well-developed and well-nourished.   HENT:   Head: Normocephalic and atraumatic.   Cardiovascular: Normal rate, regular rhythm and normal heart sounds.   No murmur heard.  Pulmonary/Chest: Effort normal and breath sounds normal.   Musculoskeletal:   She can rise from a chair only with assistance.  She may take a few steps only holding onto me.  Please see the physical therapy mobility evaluation.   Neurological: She is alert and oriented to person, place, and time.   Psychiatric: She has a normal mood and affect.   Vitals reviewed.     Procedures    ACE III MINI             Assessment/Plan:    Megan was seen today for wheelchair.    Diagnoses and all orders for this visit:    Primary osteoarthritis of both knees    Weakness of both lower extremities    Idiopathic scoliosis and kyphoscoliosis    Primary osteoarthritis of right hip    Other orders  -     atorvastatin (LIPITOR) 20 MG tablet; Take 1 tablet by mouth Daily.    Plan    Due to a combination of osteoarthritis of hips and knees, osteoporosis and scoliosis, now with possible nerve injury from the lumbar spine causing weakness in her legs, she is unable to effectively use a cane, walker, Rollator or wheelchair.  She has significant fall risk with a cane and is unable to walk safely without assistance.  Have the upper extremity strength to propel a wheelchair.  I agree with the assessment of physical therapy that she needs a scooter to safely get to the dining room and around her apartment.      Plan of care reviewed with patient at the conclusion of today's visit. Education was provided regarding diagnosis, management, and any prescribed or recommended OTC medications.Patient " verbalizes understanding of and agreement with management plan.         Juan Carlos Dunn MD

## 2020-02-17 ENCOUNTER — TELEPHONE (OUTPATIENT)
Dept: INTERNAL MEDICINE | Facility: CLINIC | Age: 85
End: 2020-02-17

## 2020-02-17 NOTE — TELEPHONE ENCOUNTER
On 2/14/2020 forms for wheelchair and office note were faxed to Heydi at [facility].     Fax:[phone]

## 2020-02-18 NOTE — TELEPHONE ENCOUNTER
Heydi called back and said when she got the forms she needs a signed office note from 2/13th the one she has isn't signed, fax to [phone]

## 2020-02-20 ENCOUNTER — TELEPHONE (OUTPATIENT)
Dept: INTERNAL MEDICINE | Facility: CLINIC | Age: 85
End: 2020-02-20

## 2020-02-20 NOTE — TELEPHONE ENCOUNTER
Johann from [facility] called and stated that pt has a written order for P.T. However he wanted to know if this will cover Home Health P.T. If it does he would like the office notes from 2/13/20 faxed over.    [phone]  [phone]

## 2020-02-20 NOTE — TELEPHONE ENCOUNTER
Called and talked to Johann with [facility]. He said pt had contacted them for PT and she is not currently enrolled with them. Order for PT and copy of last office note faxed to Johann [phone]

## 2020-03-03 ENCOUNTER — TELEPHONE (OUTPATIENT)
Dept: INTERNAL MEDICINE | Facility: CLINIC | Age: 85
End: 2020-03-03

## 2020-03-24 ENCOUNTER — TELEPHONE (OUTPATIENT)
Dept: INTERNAL MEDICINE | Facility: CLINIC | Age: 85
End: 2020-03-24

## 2020-03-24 NOTE — TELEPHONE ENCOUNTER
Patient called and stated that she would like a prescription for the following prescription(s):    Diclofenac sodium topical gel 1%    Pharmacy: [facility] on [address]  PH: [phone]  FX: [phone]    Patient callback: [phone]    Pt stated that she has begun to have pain in both of her knees and has been told that this topical pain relieving gel is very useful.    Please advise.

## 2020-05-27 ENCOUNTER — TELEPHONE (OUTPATIENT)
Dept: INTERNAL MEDICINE | Facility: CLINIC | Age: 85
End: 2020-05-27

## 2020-05-27 DIAGNOSIS — E78.2 MIXED HYPERLIPIDEMIA: ICD-10-CM

## 2020-05-27 DIAGNOSIS — I10 BENIGN ESSENTIAL HYPERTENSION: Primary | ICD-10-CM

## 2020-05-27 DIAGNOSIS — M81.0 AGE-RELATED OSTEOPOROSIS WITHOUT CURRENT PATHOLOGICAL FRACTURE: ICD-10-CM

## 2020-05-27 NOTE — TELEPHONE ENCOUNTER
Called and spoke w/ patient. She wont be able to complete labs prior to appointment but will get them done as soon as she can. Lab process given to patient.

## 2020-05-27 NOTE — TELEPHONE ENCOUNTER
Pt has Medicare Wellness visit on Friday at 12:45PM & will come that AM for fasting labs.    Pls enter lab order.

## 2020-05-29 ENCOUNTER — OFFICE VISIT (OUTPATIENT)
Dept: INTERNAL MEDICINE | Facility: CLINIC | Age: 85
End: 2020-05-29

## 2020-05-29 VITALS
TEMPERATURE: 98.2 F | HEIGHT: 61 IN | DIASTOLIC BLOOD PRESSURE: 72 MMHG | BODY MASS INDEX: 21.3 KG/M2 | WEIGHT: 112.8 LBS | HEART RATE: 72 BPM | SYSTOLIC BLOOD PRESSURE: 124 MMHG

## 2020-05-29 DIAGNOSIS — I10 BENIGN ESSENTIAL HYPERTENSION: ICD-10-CM

## 2020-05-29 DIAGNOSIS — M16.11 PRIMARY OSTEOARTHRITIS OF RIGHT HIP: ICD-10-CM

## 2020-05-29 DIAGNOSIS — E78.2 MIXED HYPERLIPIDEMIA: ICD-10-CM

## 2020-05-29 DIAGNOSIS — K58.1 IRRITABLE BOWEL SYNDROME WITH CONSTIPATION: ICD-10-CM

## 2020-05-29 DIAGNOSIS — Z00.00 PREVENTATIVE HEALTH CARE: Primary | ICD-10-CM

## 2020-05-29 DIAGNOSIS — J30.1 SEASONAL ALLERGIC RHINITIS DUE TO POLLEN: ICD-10-CM

## 2020-05-29 DIAGNOSIS — M17.0 PRIMARY OSTEOARTHRITIS OF BOTH KNEES: ICD-10-CM

## 2020-05-29 DIAGNOSIS — M41.20 IDIOPATHIC SCOLIOSIS AND KYPHOSCOLIOSIS: ICD-10-CM

## 2020-05-29 PROCEDURE — G0439 PPPS, SUBSEQ VISIT: HCPCS | Performed by: INTERNAL MEDICINE

## 2020-05-29 RX ORDER — AMLODIPINE BESYLATE 10 MG/1
10 TABLET ORAL DAILY
Qty: 90 TABLET | Refills: 3 | Status: SHIPPED | OUTPATIENT
Start: 2020-05-29 | End: 2021-05-24

## 2020-05-29 RX ORDER — FEXOFENADINE HCL 180 MG/1
180 TABLET ORAL DAILY
COMMUNITY

## 2020-05-29 NOTE — PROGRESS NOTES
The ABCs of the Annual Wellness Visit  Subsequent Medicare Wellness Visit    Chief Complaint   Patient presents with   • Annual Exam     Subsequent Medicare Physical       Subjective   History of Present Illness:  Megan Garcia is a 85 y.o. female who presents for a Subsequent Medicare Wellness Visit.  She is doing very well.  She has continued doing her own therapy while her facility has been basically shut down.  She has walked consistently in her room and now can walk without her walker.  She has some knee discomfort but no hip pain.  Her irritable bowel is basically well controlled with stool softener every night.  Sometimes it is too much and she has diarrhea.    HEALTH RISK ASSESSMENT    Recent Hospitalizations:  No hospitalization(s) within the last year.    Current Medical Providers:  Patient Care Team:  Juan Carlos Dunn MD as PCP - General (Internal Medicine)  Juan Carlos Dunn MD as PCP - Claims Attributed    Smoking Status:  Social History     Tobacco Use   Smoking Status Never Smoker   Smokeless Tobacco Never Used       Alcohol Consumption:  Social History     Substance and Sexual Activity   Alcohol Use No   • Frequency: Never       Depression Screen:   PHQ-2/PHQ-9 Depression Screening 5/29/2020   Little interest or pleasure in doing things 1   Feeling down, depressed, or hopeless 1   Trouble falling or staying asleep, or sleeping too much 1   Feeling tired or having little energy 2   Poor appetite or overeating 0   Feeling bad about yourself - or that you are a failure or have let yourself or your family down 0   Trouble concentrating on things, such as reading the newspaper or watching television 0   Moving or speaking so slowly that other people could have noticed. Or the opposite - being so fidgety or restless that you have been moving around a lot more than usual 0   Thoughts that you would be better off dead, or of hurting yourself in some way 0   Total Score 5   If you checked off any  problems, how difficult have these problems made it for you to do your work, take care of things at home, or get along with other people? Somewhat difficult       Fall Risk Screen:  TRUONGADI Fall Risk Assessment was completed, and patient is at MODERATE risk for falls. Assessment completed on:5/29/2020    Health Habits and Functional and Cognitive Screening:  Functional & Cognitive Status 5/29/2020   Do you have difficulty preparing food and eating? No   Do you have difficulty bathing yourself, getting dressed or grooming yourself? Yes   Do you have difficulty using the toilet? No   Do you have difficulty moving around from place to place? Yes   Do you have trouble with steps or getting out of a bed or a chair? Yes   Current Diet Well Balanced Diet   Dental Exam Up to date   Eye Exam Up to date   Exercise (times per week) 0 times per week   Current Exercise Activities Include None   Do you need help using the phone?  No   Are you deaf or do you have serious difficulty hearing?  Yes   Do you need help with transportation? Yes   Do you need help shopping? Yes   Do you need help preparing meals?  No   Do you need help with housework?  No   Do you need help with laundry? Yes   Do you need help taking your medications? No   Do you need help managing money? No   Do you ever drive or ride in a car without wearing a seat belt? Yes   Have you felt unusual stress, anger or loneliness in the last month? Yes   Who do you live with? Community   If you need help, do you have trouble finding someone available to you? No   Have you been bothered in the last four weeks by sexual problems? No   Do you have difficulty concentrating, remembering or making decisions? No         Does the patient have evidence of cognitive impairment? Yes    Asprin use counseling:Taking ASA appropriately as indicated    Age-appropriate Screening Schedule:  Refer to the list below for future screening recommendations based on patient's age, sex and/or  medical conditions. Orders for these recommended tests are listed in the plan section. The patient has been provided with a written plan.    Health Maintenance   Topic Date Due   • TDAP/TD VACCINES (1 - Tdap) 03/06/1946   • ZOSTER VACCINE (2 of 3) 04/14/2009   • LIPID PANEL  03/13/2020   • INFLUENZA VACCINE  08/01/2020   • DXA SCAN  07/17/2021          The following portions of the patient's history were reviewed and updated as appropriate: allergies, current medications, past family history, past medical history, past social history, past surgical history and problem list.    Outpatient Medications Prior to Visit   Medication Sig Dispense Refill   • acetaminophen (TYLENOL 8 HOUR ARTHRITIS PAIN) 650 MG 8 hr tablet Take 1 tablet by mouth Daily.     • acetaminophen (TYLENOL) 500 MG tablet Take 500 mg by mouth every night at bedtime. Takes 1 tablet with breakfast  And 1 tablet w/supper     • ASPIRIN 81 PO Take 81 mg by mouth Daily.     • atorvastatin (LIPITOR) 20 MG tablet Take 1 tablet by mouth Daily. 90 tablet 3   • CALCIUM PO Take 1 tablet by mouth Daily. 2 gummies  Tries to take it daily     • clopidogrel (PLAVIX) 75 MG tablet Take 1 tablet by mouth Daily. 90 tablet 1   • diclofenac (VOLTAREN) 1 % gel gel Apply 4 g topically to the appropriate area as directed 4 (Four) Times a Day As Needed (pain). 60 g 1   • docusate sodium (COLACE) 100 MG capsule Take 100 mg by mouth Daily.     • famotidine (PEPCID) 20 MG tablet Take 1 tablet by mouth Daily. 90 tablet 1   • fexofenadine (ALLEGRA) 180 MG tablet Take 180 mg by mouth Daily.     • lisinopril (PRINIVIL,ZESTRIL) 40 MG tablet TAKE 1 TABLET BY MOUTH  DAILY 90 tablet 3   • Multiple Vitamins-Minerals (MULTIVITAMIN ADULT PO) Take 1 tablet by mouth Daily.     • vitamin C (ASCORBIC ACID) 500 MG tablet Take 1 tablet by mouth Daily.     • amLODIPine (NORVASC) 10 MG tablet TAKE ONE TABLET BY MOUTH DAILY 30 tablet 5   • cetirizine (zyrTEC) 10 MG tablet Take 10 mg by mouth Daily.   "   • ferrous sulfate 325 (65 FE) MG tablet Take 325 mg by mouth Daily With Breakfast.       No facility-administered medications prior to visit.        Patient Active Problem List   Diagnosis   • Allergic rhinitis   • At risk for falls   • Ataxic gait   • Benign essential hypertension   • Idiopathic scoliosis and kyphoscoliosis   • Irritable bowel syndrome with constipation   • Mixed hyperlipidemia   • Non-allergic rhinitis   • Osteoarthritis   • Osteoporosis   • Urinary incontinence   • Primary osteoarthritis of both knees   • Primary osteoarthritis of right hip       Advanced Care Planning:  ACP discussion was held with the patient during this visit. Patient has an advance directive (not in EMR), copy requested.    Review of Systems   Constitutional: Negative for chills, fatigue and fever.   HENT: Negative for congestion, ear pain and sinus pressure.    Respiratory: Negative for cough, chest tightness, shortness of breath and wheezing.    Cardiovascular: Negative for chest pain and palpitations.   Gastrointestinal: Negative for abdominal pain, blood in stool and constipation.   Skin: Negative for color change.   Allergic/Immunologic: Negative for environmental allergies.   Neurological: Negative for dizziness, speech difficulty and headaches.   Psychiatric/Behavioral: Negative for confusion. The patient is not nervous/anxious.        Compared to one year ago, the patient feels her physical health is the same.  Compared to one year ago, the patient feels her mental health is the same.    Reviewed chart for potential of high risk medication in the elderly: yes  Reviewed chart for potential of harmful drug interactions in the elderly:yes    Objective         Vitals:    05/29/20 1243   BP: 124/72   BP Location: Left arm   Patient Position: Sitting   Cuff Size: Adult   Pulse: 72   Temp: 98.2 °F (36.8 °C)   Weight: 51.2 kg (112 lb 12.8 oz)   Height: 154 cm (60.63\")   PainSc: 0-No pain       Body mass index is 21.57 " kg/m².  Discussed the patient's BMI with her. The BMI is in the acceptable range.    Physical Exam   Constitutional: She is oriented to person, place, and time. She appears well-developed and well-nourished. No distress.   HENT:   Head: Normocephalic and atraumatic.   Right Ear: External ear normal.   Left Ear: External ear normal.   Mouth/Throat: Oropharynx is clear and moist.   Eyes: Pupils are equal, round, and reactive to light. EOM are normal.   Neck: Normal range of motion. Neck supple. No JVD present. No tracheal deviation present. No thyromegaly present.   Cardiovascular: Normal rate, regular rhythm, normal heart sounds and intact distal pulses.   No murmur heard.  Pulmonary/Chest: Effort normal and breath sounds normal. She has no wheezes.   Abdominal: Soft. Bowel sounds are normal. She exhibits no mass. There is no tenderness.   Musculoskeletal: She exhibits no edema.   Marked thoracolumbar kyphoscoliosis   Lymphadenopathy:     She has no cervical adenopathy.   Neurological: She is alert and oriented to person, place, and time. She displays normal reflexes. No cranial nerve deficit or sensory deficit. She exhibits normal muscle tone. Coordination normal.   Skin: Skin is warm and dry.   Psychiatric: She has a normal mood and affect. Her behavior is normal. Judgment and thought content normal.             Assessment/Plan   Medicare Risks and Personalized Health Plan  CMS Preventative Services Quick Reference  Advance Directive Discussion  Fall Risk    The above risks/problems have been discussed with the patient.  Pertinent information has been shared with the patient in the After Visit Summary.  Follow up plans and orders are seen below in the Assessment/Plan Section.    Diagnoses and all orders for this visit:    1. Preventative health care (Primary)    2. Benign essential hypertension    3. Mixed hyperlipidemia    4. Seasonal allergic rhinitis due to pollen    5. Irritable bowel syndrome with  constipation    6. Idiopathic scoliosis and kyphoscoliosis    7. Primary osteoarthritis of right hip    8. Primary osteoarthritis of both knees    Other orders  -     amLODIPine (NORVASC) 10 MG tablet; Take 1 tablet by mouth Daily.  Dispense: 90 tablet; Refill: 3      Plan    Given her comorbidities, she is doing remarkably well.    Blood pressure is well controlled on lisinopril and amlodipine.  She will continue aspirin and clopidogrel for stroke protection.    Lipids were well controlled in November.  She will continue healthy diet and atorvastatin.  Will repeat in the fall.    Allergies are well controlled on fexofenadine.    IBS appears to be well compensated.    She manages her arthritis and scoliosis with Tylenol and regular physical activity.  She may need to see orthopedics for another knee injection the summer.  Follow Up:  Return in about 6 months (around 11/29/2020) for  fasting.     An After Visit Summary and PPPS were given to the patient.

## 2020-07-27 ENCOUNTER — LAB (OUTPATIENT)
Dept: LAB | Facility: HOSPITAL | Age: 85
End: 2020-07-27

## 2020-07-27 DIAGNOSIS — E78.2 MIXED HYPERLIPIDEMIA: ICD-10-CM

## 2020-07-27 DIAGNOSIS — I10 BENIGN ESSENTIAL HYPERTENSION: ICD-10-CM

## 2020-07-27 DIAGNOSIS — M81.0 AGE-RELATED OSTEOPOROSIS WITHOUT CURRENT PATHOLOGICAL FRACTURE: ICD-10-CM

## 2020-07-27 LAB
25(OH)D3 SERPL-MCNC: 43.7 NG/ML (ref 30–100)
ALBUMIN SERPL-MCNC: 4.6 G/DL (ref 3.5–5.2)
ALBUMIN/GLOB SERPL: 1.8 G/DL
ALP SERPL-CCNC: 61 U/L (ref 39–117)
ALT SERPL W P-5'-P-CCNC: 16 U/L (ref 1–33)
ANION GAP SERPL CALCULATED.3IONS-SCNC: 13.7 MMOL/L (ref 5–15)
AST SERPL-CCNC: 19 U/L (ref 1–32)
BASOPHILS # BLD AUTO: 0.06 10*3/MM3 (ref 0–0.2)
BASOPHILS NFR BLD AUTO: 0.8 % (ref 0–1.5)
BILIRUB SERPL-MCNC: 0.6 MG/DL (ref 0–1.2)
BUN SERPL-MCNC: 13 MG/DL (ref 8–23)
BUN/CREAT SERPL: 17.1 (ref 7–25)
CALCIUM SPEC-SCNC: 9.6 MG/DL (ref 8.6–10.5)
CHLORIDE SERPL-SCNC: 101 MMOL/L (ref 98–107)
CHOLEST SERPL-MCNC: 153 MG/DL (ref 0–200)
CO2 SERPL-SCNC: 22.3 MMOL/L (ref 22–29)
CREAT SERPL-MCNC: 0.76 MG/DL (ref 0.57–1)
DEPRECATED RDW RBC AUTO: 42.1 FL (ref 37–54)
EOSINOPHIL # BLD AUTO: 0.08 10*3/MM3 (ref 0–0.4)
EOSINOPHIL NFR BLD AUTO: 1 % (ref 0.3–6.2)
ERYTHROCYTE [DISTWIDTH] IN BLOOD BY AUTOMATED COUNT: 12.3 % (ref 12.3–15.4)
GFR SERPL CREATININE-BSD FRML MDRD: 72 ML/MIN/1.73
GLOBULIN UR ELPH-MCNC: 2.5 GM/DL
GLUCOSE SERPL-MCNC: 96 MG/DL (ref 65–99)
HCT VFR BLD AUTO: 40.3 % (ref 34–46.6)
HDLC SERPL-MCNC: 67 MG/DL (ref 40–60)
HGB BLD-MCNC: 14 G/DL (ref 12–15.9)
IMM GRANULOCYTES # BLD AUTO: 0.04 10*3/MM3 (ref 0–0.05)
IMM GRANULOCYTES NFR BLD AUTO: 0.5 % (ref 0–0.5)
LDLC SERPL CALC-MCNC: 70 MG/DL (ref 0–100)
LDLC/HDLC SERPL: 1.04 {RATIO}
LYMPHOCYTES # BLD AUTO: 1.39 10*3/MM3 (ref 0.7–3.1)
LYMPHOCYTES NFR BLD AUTO: 17.5 % (ref 19.6–45.3)
MCH RBC QN AUTO: 32.4 PG (ref 26.6–33)
MCHC RBC AUTO-ENTMCNC: 34.7 G/DL (ref 31.5–35.7)
MCV RBC AUTO: 93.3 FL (ref 79–97)
MONOCYTES # BLD AUTO: 0.72 10*3/MM3 (ref 0.1–0.9)
MONOCYTES NFR BLD AUTO: 9.1 % (ref 5–12)
NEUTROPHILS NFR BLD AUTO: 5.66 10*3/MM3 (ref 1.7–7)
NEUTROPHILS NFR BLD AUTO: 71.1 % (ref 42.7–76)
NRBC BLD AUTO-RTO: 0 /100 WBC (ref 0–0.2)
PLATELET # BLD AUTO: 263 10*3/MM3 (ref 140–450)
PMV BLD AUTO: 9.9 FL (ref 6–12)
POTASSIUM SERPL-SCNC: 4.1 MMOL/L (ref 3.5–5.2)
PROT SERPL-MCNC: 7.1 G/DL (ref 6–8.5)
RBC # BLD AUTO: 4.32 10*6/MM3 (ref 3.77–5.28)
SODIUM SERPL-SCNC: 137 MMOL/L (ref 136–145)
TRIGL SERPL-MCNC: 81 MG/DL (ref 0–150)
VLDLC SERPL-MCNC: 16.2 MG/DL (ref 5–40)
WBC # BLD AUTO: 7.95 10*3/MM3 (ref 3.4–10.8)

## 2020-07-27 PROCEDURE — 82306 VITAMIN D 25 HYDROXY: CPT

## 2020-07-27 PROCEDURE — 80053 COMPREHEN METABOLIC PANEL: CPT

## 2020-07-27 PROCEDURE — 85025 COMPLETE CBC W/AUTO DIFF WBC: CPT

## 2020-07-27 PROCEDURE — 80061 LIPID PANEL: CPT

## 2020-07-27 RX ORDER — FAMOTIDINE 20 MG/1
20 TABLET, FILM COATED ORAL DAILY
Qty: 90 TABLET | Refills: 3 | Status: SHIPPED | OUTPATIENT
Start: 2020-07-27 | End: 2021-02-18

## 2020-07-27 RX ORDER — CLOPIDOGREL BISULFATE 75 MG/1
75 TABLET ORAL DAILY
Qty: 90 TABLET | Refills: 3 | Status: SHIPPED | OUTPATIENT
Start: 2020-07-27 | End: 2021-08-06

## 2020-09-08 RX ORDER — LISINOPRIL 40 MG/1
40 TABLET ORAL DAILY
Qty: 90 TABLET | Refills: 3 | Status: SHIPPED | OUTPATIENT
Start: 2020-09-08 | End: 2021-08-19

## 2020-09-09 RX ORDER — CLOPIDOGREL BISULFATE 75 MG/1
75 TABLET ORAL DAILY
Qty: 90 TABLET | Refills: 3 | OUTPATIENT
Start: 2020-09-09

## 2020-09-09 RX ORDER — LISINOPRIL 40 MG/1
40 TABLET ORAL DAILY
Qty: 90 TABLET | Refills: 3 | OUTPATIENT
Start: 2020-09-09

## 2020-09-09 RX ORDER — FAMOTIDINE 20 MG/1
20 TABLET, FILM COATED ORAL DAILY
Qty: 90 TABLET | Refills: 3 | OUTPATIENT
Start: 2020-09-09

## 2020-09-09 NOTE — TELEPHONE ENCOUNTER
Pt is requesting a refill of clopidogrel, famotidine and lisinopril and have them sent to [facility].     Pt said she is doing better and has a lot to talk about at her next appt.

## 2020-11-17 ENCOUNTER — TELEPHONE (OUTPATIENT)
Dept: INTERNAL MEDICINE | Facility: CLINIC | Age: 85
End: 2020-11-17

## 2020-11-17 DIAGNOSIS — K59.04 CHRONIC IDIOPATHIC CONSTIPATION: Primary | ICD-10-CM

## 2020-11-17 DIAGNOSIS — R19.4 CHANGE IN BOWEL HABITS: ICD-10-CM

## 2020-11-17 NOTE — TELEPHONE ENCOUNTER
PATIENT CALLED REQUESTING THAT LUIZ CALL HER ASAP. SHE ONLY WANTS TO SPEAK TO DR. GILBERT OR LUIZ     PT STATED HER LAST VISIT DR. GILBERT WAS PLEASE WITH HER CONDITION BUT SHE IS NOT DOING WELL.    PT NEEDS TO BE REFERRED TO A GASTROLOGIST. SHE WANTS DR. JANAE SIMMONS AT [facility] [address] PH: [phone]. RECOMMENDED BY HER THERAPIST.    PT WANTED TO INFORM DR. GILBERT THAT SHE HAS HAD HER FLU SHOT ALREADY    PLEASE ADVISE AND CALL PATIENT [phone]

## 2020-11-17 NOTE — TELEPHONE ENCOUNTER
Patient only wanted to speak with Cecily or Dr. Dunn but wants to  make sure she gets a referral to Dr. Usha Lion.

## 2020-11-18 NOTE — TELEPHONE ENCOUNTER
Pt states she is having severe constipation. She doesn't remember her last BM, but feels like she has some leakage of stool. She was using stool softeners and did well for a while. She said Dr. Kaylee Hu or Dr. Lion

## 2020-12-02 ENCOUNTER — OFFICE VISIT (OUTPATIENT)
Dept: INTERNAL MEDICINE | Facility: CLINIC | Age: 85
End: 2020-12-02

## 2020-12-02 VITALS
HEIGHT: 61 IN | TEMPERATURE: 97.1 F | BODY MASS INDEX: 20.62 KG/M2 | SYSTOLIC BLOOD PRESSURE: 128 MMHG | WEIGHT: 109.2 LBS | HEART RATE: 84 BPM | DIASTOLIC BLOOD PRESSURE: 68 MMHG

## 2020-12-02 DIAGNOSIS — M41.20 IDIOPATHIC SCOLIOSIS AND KYPHOSCOLIOSIS: ICD-10-CM

## 2020-12-02 DIAGNOSIS — E78.2 MIXED HYPERLIPIDEMIA: ICD-10-CM

## 2020-12-02 DIAGNOSIS — K58.1 IRRITABLE BOWEL SYNDROME WITH CONSTIPATION: ICD-10-CM

## 2020-12-02 DIAGNOSIS — I10 BENIGN ESSENTIAL HYPERTENSION: Primary | ICD-10-CM

## 2020-12-02 DIAGNOSIS — M81.0 AGE-RELATED OSTEOPOROSIS WITHOUT CURRENT PATHOLOGICAL FRACTURE: ICD-10-CM

## 2020-12-02 PROCEDURE — 99214 OFFICE O/P EST MOD 30 MIN: CPT | Performed by: INTERNAL MEDICINE

## 2020-12-02 NOTE — PROGRESS NOTES
[facility]     Megan Garcia  1935   [id number]      Patient Care Team:  Juan Carlos Dunn MD as PCP - General (Internal Medicine)    Chief Complaint::   Chief Complaint   Patient presents with   • Hypertension   • Hyperlipidemia        HPI  Ms. Garcia comes in for follow-up of her hypertension, hyperlipidemia, IBS with constipation, back pain secondary to scoliosis and osteoporosis.  Her primary complaint is ongoing constipation.  When she takes 100 mg of docusate every night she actually has a loose stool.  If she does not take it she has a hard stool and is uncomfortable.  She has an appointment to see Dr. Hu for evaluation.  She has tried other laxatives all of which have been too harsh.  She has had no further neurologic symptoms.  She tries to stay active and other than constipation and diarrhea feels well.    Chronic Conditions:      Patient Active Problem List   Diagnosis   • Allergic rhinitis   • At risk for falls   • Ataxic gait   • Benign essential hypertension   • Idiopathic scoliosis and kyphoscoliosis   • Irritable bowel syndrome with constipation   • Mixed hyperlipidemia   • Non-allergic rhinitis   • Osteoarthritis   • Osteoporosis   • Urinary incontinence   • Primary osteoarthritis of both knees   • Primary osteoarthritis of right hip        Past Medical History:   Diagnosis Date   • Allergic    • Arthritis    • Hyperlipidemia    • Hypertension    • Intervertebral disc displacement    • Left periventricular lacunar stroke 10/19/2015       Past Surgical History:   Procedure Laterality Date   • LAMINECTOMY  1974   • TOTAL KNEE ARTHROPLASTY Left 2004       Family History   Problem Relation Age of Onset   • Coronary artery disease Father        Social History     Socioeconomic History   • Marital status:      Spouse name: Not on file   • Number of children: Not on file   • Years of education: Not on file   • Highest education level: Not on file   Tobacco Use   •  Smoking status: Never Smoker   • Smokeless tobacco: Never Used   Substance and Sexual Activity   • Alcohol use: No     Frequency: Never   • Drug use: No   • Sexual activity: Defer       No Known Allergies      Current Outpatient Medications:   •  acetaminophen (TYLENOL 8 HOUR ARTHRITIS PAIN) 650 MG 8 hr tablet, Take 1 tablet by mouth Daily., Disp: , Rfl:   •  acetaminophen (TYLENOL) 500 MG tablet, Take 500 mg by mouth every night at bedtime. Takes 1 tablet with breakfast  And 1 tablet w/supper, Disp: , Rfl:   •  amLODIPine (NORVASC) 10 MG tablet, Take 1 tablet by mouth Daily., Disp: 90 tablet, Rfl: 3  •  ASPIRIN 81 PO, Take 81 mg by mouth Daily., Disp: , Rfl:   •  atorvastatin (LIPITOR) 20 MG tablet, Take 1 tablet by mouth Daily., Disp: 90 tablet, Rfl: 3  •  CALCIUM PO, Take 1 tablet by mouth Daily. 2 gummies  Tries to take it daily, Disp: , Rfl:   •  clopidogrel (PLAVIX) 75 MG tablet, TAKE 1 TABLET BY MOUTH  DAILY, Disp: 90 tablet, Rfl: 3  •  diclofenac (VOLTAREN) 1 % gel gel, Apply 4 g topically to the appropriate area as directed 4 (Four) Times a Day As Needed (pain)., Disp: 60 g, Rfl: 1  •  docusate sodium (COLACE) 100 MG capsule, Take 100 mg by mouth Daily., Disp: , Rfl:   •  famotidine (PEPCID) 20 MG tablet, TAKE 1 TABLET BY MOUTH  DAILY, Disp: 90 tablet, Rfl: 3  •  fexofenadine (ALLEGRA) 180 MG tablet, Take 180 mg by mouth Daily., Disp: , Rfl:   •  lisinopril (PRINIVIL,ZESTRIL) 40 MG tablet, TAKE 1 TABLET BY MOUTH  DAILY, Disp: 90 tablet, Rfl: 3  •  Multiple Vitamins-Minerals (MULTIVITAMIN ADULT PO), Take 1 tablet by mouth Daily., Disp: , Rfl:   •  vitamin C (ASCORBIC ACID) 500 MG tablet, Take 1 tablet by mouth Daily., Disp: , Rfl:     Review of Systems   Constitutional: Negative for chills, fatigue and fever.   HENT: Negative for congestion, ear pain and sinus pressure.    Respiratory: Negative for cough, chest tightness, shortness of breath and wheezing.    Cardiovascular: Negative for chest pain and  "palpitations.   Gastrointestinal: Positive for anal bleeding, constipation and rectal pain. Negative for abdominal pain and blood in stool.   Skin: Negative for color change.   Allergic/Immunologic: Negative for environmental allergies.   Neurological: Negative for dizziness, speech difficulty and headache.   Psychiatric/Behavioral: Negative for decreased concentration. The patient is not nervous/anxious.         Vital Signs  Vitals:    12/02/20 1101   BP: 128/68   BP Location: Left arm   Patient Position: Sitting   Cuff Size: Adult   Pulse: 84   Temp: 97.1 °F (36.2 °C)   Weight: 49.5 kg (109 lb 3.2 oz)   Height: 154 cm (60.63\")   PainSc: 0-No pain       Physical Exam  Vitals signs reviewed.   Constitutional:       Appearance: She is well-developed.   HENT:      Head: Normocephalic and atraumatic.   Cardiovascular:      Rate and Rhythm: Normal rate and regular rhythm.      Heart sounds: Normal heart sounds. No murmur.   Pulmonary:      Effort: Pulmonary effort is normal.      Breath sounds: Normal breath sounds.   Neurological:      Mental Status: She is alert and oriented to person, place, and time.          Procedures    ACE III MINI             Assessment/Plan:    Diagnoses and all orders for this visit:    1. Benign essential hypertension (Primary)    2. Mixed hyperlipidemia    3. Irritable bowel syndrome with constipation    4. Idiopathic scoliosis and kyphoscoliosis    5. Age-related osteoporosis without current pathological fracture    Plan    Blood pressure is well controlled on lisinopril and amlodipine.  She will continue aspirin and clopidogrel for stroke protection.    She will continue atorvastatin for lipids.  We will plan fasting labs next visit.    She will soon see GI for evaluation of her IBS with constipation.  She appears to be very sensitive to even stool softeners.    Back pain is reasonably well controlled with Tylenol and Voltaren gel as needed.    She will continue calcium and vitamin D for " treatment of her osteoporosis.      Plan of care reviewed with patient at the conclusion of today's visit. Education was provided regarding diagnosis, management, and any prescribed or recommended OTC medications.Patient verbalizes understanding of and agreement with management plan.         Juan Carlos Dunn MD

## 2021-01-04 ENCOUNTER — TELEPHONE (OUTPATIENT)
Dept: INTERNAL MEDICINE | Facility: CLINIC | Age: 86
End: 2021-01-04

## 2021-01-04 NOTE — TELEPHONE ENCOUNTER
She may stop pepcid and see if any of those symptoms improve.  She should let us know if she has heartburn or indigestion.

## 2021-01-04 NOTE — TELEPHONE ENCOUNTER
Pt called stating that the pharmacy has had Pepcid on back order for 30 days. She received it in the mail today and after talking with her daughter and daughter in law she has decided that she does not want to go back on the Pepcid because of the side effects of constipation and diarrhea.    Also she wanted to let Dr. Dunn know that the soonest she could get in to see Dr. Kaylee Hu is March 24th at 1pm.    Pt would like for Blanca to call her back.

## 2021-01-07 ENCOUNTER — TELEPHONE (OUTPATIENT)
Dept: INTERNAL MEDICINE | Facility: CLINIC | Age: 86
End: 2021-01-07

## 2021-01-07 NOTE — TELEPHONE ENCOUNTER
PATIENT REPORTS THAT SHE RECEIVED THE MESSAGE ABOUT HER MEDICATION AND SHE UNDERSTANDS IT AND IF SHE NEEDS TO TAKE IT SHE WILL START TAKING IT AGAIN. PATIENT SAID THANK YOU FOR EVERYTHING THAT YOU DO!

## 2021-02-15 ENCOUNTER — TELEPHONE (OUTPATIENT)
Dept: INTERNAL MEDICINE | Facility: CLINIC | Age: 86
End: 2021-02-15

## 2021-02-15 NOTE — TELEPHONE ENCOUNTER
She had told us last month that she wasn't going to resume pepcid because it might have been affecting her bowels.  I assume she now does not believe that is the case.  Has eating apricots helped?  I'm not sure what her question is.

## 2021-02-15 NOTE — TELEPHONE ENCOUNTER
PT STATES THAT SHE HAD HER MEDICATION WAS ON HOLD FOR 30 DAYS AND STOPPED TAKING IT. PT STATES THAT SHE IS NOW TAKING TAKING MEDICATION AGAIN    famotidine (PEPCID) 20 MG tablet    PT STATES THAT SHE IS EATING APRICOTS EVERY OTHER DAY. PT STATES THAT THEY ARE TRYING TO FIGURE OUT A GOOD TIME TO EAT THEM BUT RIGHT NOW SHE IS EATING APRICOTS AT 5:30 AM EVERY OTHER MORNING. PT STATES THAT SHE HOPES THIS HELPS HER COLON      PLEASE ADVISE  [phone]

## 2021-02-15 NOTE — TELEPHONE ENCOUNTER
Spoke to patient.  She states eating apricots has helped her bowels.   She has decided to restart the pepcid.   She does not have a question-  She just wanted you to know she has restarted the pepcid so you could update your records.

## 2021-02-18 NOTE — TELEPHONE ENCOUNTER
PATIENT CALLED BACK AND STATED SHE RECEIVED THE VOICEMAIL. PATIENT EXPRESSED VERBAL UNDERSTANDING. PATIENT STATES THAT ANSWERED HER QUESTION AND SHE APPRECIATED THE ASSISTANCE.     CONTACT: [phone]

## 2021-02-18 NOTE — TELEPHONE ENCOUNTER
JANAE for pt we rec'd her message and will forward to Dr. Dunn. Advised to call if further assistance is needed

## 2021-02-18 NOTE — TELEPHONE ENCOUNTER
PATIENT WANTED TO LET DOCTOR OFELIA OR LUIZ KNOW THAT SHE HAS STOPPED TAKING THE PEPCID MEDICATION BECAUSE IT WAS CAUSING HER THE SEVERE CONSTIPATION    PATIENT STATED THAT SHE IS PRETTY SURE IT'S THE MEDICATION THAT IS CAUSING HER ISSUES AND THAT SHE FEELS BETTER WITHOUT IT

## 2021-02-19 ENCOUNTER — IMMUNIZATION (OUTPATIENT)
Dept: VACCINE CLINIC | Facility: HOSPITAL | Age: 86
End: 2021-02-19

## 2021-02-19 PROCEDURE — 0011A: CPT | Performed by: INTERNAL MEDICINE

## 2021-02-19 PROCEDURE — 91301 HC SARSCO02 VAC 100MCG/0.5ML IM: CPT | Performed by: INTERNAL MEDICINE

## 2021-03-08 RX ORDER — ATORVASTATIN CALCIUM 20 MG/1
20 TABLET, FILM COATED ORAL DAILY
Qty: 90 TABLET | Refills: 3 | Status: SHIPPED | OUTPATIENT
Start: 2021-03-08 | End: 2023-03-06

## 2021-03-19 ENCOUNTER — IMMUNIZATION (OUTPATIENT)
Dept: VACCINE CLINIC | Facility: HOSPITAL | Age: 86
End: 2021-03-19

## 2021-03-19 PROCEDURE — 0012A: CPT | Performed by: PHYSICIAN ASSISTANT

## 2021-03-19 PROCEDURE — 91301 HC SARSCO02 VAC 100MCG/0.5ML IM: CPT | Performed by: PHYSICIAN ASSISTANT

## 2021-03-23 ENCOUNTER — TELEPHONE (OUTPATIENT)
Dept: INTERNAL MEDICINE | Facility: CLINIC | Age: 86
End: 2021-03-23

## 2021-03-23 NOTE — TELEPHONE ENCOUNTER
Patient has fluid in left ear noticed it a couple of days ago thought it was her hearing aid but noticed its not she hears swishing sounds like its moving around in her ear. Patient does not want to come in for an appointment.

## 2021-03-23 NOTE — TELEPHONE ENCOUNTER
Caller: Megan Garcia    Relationship: Self    Best call back number:[phone]     What medication are you requesting:     What are your current symptoms: FLUID IN LEFT EAR    How long have you been experiencing symptoms: FEW DAYS    Have you had these symptoms before:    [x] Yes  [] No    Have you been treated for these symptoms before:   [x] Yes  [] No    If a prescription is needed, what is your preferred pharmacy and phone number: [facility] [address] AT [facility] [address]  - [phone]  - [phone] [facility]     Additional notes: PATIENT DOESN'T WANT TO COME IN THE OFFICE.

## 2021-03-23 NOTE — TELEPHONE ENCOUNTER
Called patient back and advised her to try Mucinex twice a day and to scheduled an appointment if symptoms have not improved. Patient confirmed verbal understanding

## 2021-03-23 NOTE — TELEPHONE ENCOUNTER
She may try Mucinex twice a day as needed.  If not improving, she will need to come in so we can examine the ears and see if wax needs to be cleaned out.

## 2021-05-24 RX ORDER — AMLODIPINE BESYLATE 10 MG/1
TABLET ORAL
Qty: 90 TABLET | Refills: 2 | Status: SHIPPED | OUTPATIENT
Start: 2021-05-24 | End: 2022-02-26

## 2021-05-27 ENCOUNTER — TELEPHONE (OUTPATIENT)
Dept: INTERNAL MEDICINE | Facility: CLINIC | Age: 86
End: 2021-05-27

## 2021-05-27 DIAGNOSIS — I10 BENIGN ESSENTIAL HYPERTENSION: Primary | ICD-10-CM

## 2021-05-27 DIAGNOSIS — M81.0 AGE-RELATED OSTEOPOROSIS WITHOUT CURRENT PATHOLOGICAL FRACTURE: ICD-10-CM

## 2021-05-27 DIAGNOSIS — E78.2 MIXED HYPERLIPIDEMIA: ICD-10-CM

## 2021-05-27 NOTE — TELEPHONE ENCOUNTER
Caller: Megan Garcia    Relationship: Self    Best call back number: [phone]    What orders are you requesting (i.e. lab or imaging): LAB ORDERS    In what timeframe would the patient need to come in: BY 6/2    Where will you receive your lab/imaging services:     Additional notes:       PATIENT REQUESTING LAB ORDERS TO BE PLACED FOR HER UPCOMING APPOINTMENT WITH DOCTOR GILBERT 6/3.    PATIENT WOULD LIKE TO HAVE LAB DRAW PRIOR TO APPOINTMENT.    PATIENT REQUESTS CALL BACK TO CONFIRM LAB ORDERS HAVE BEEN PLACED.

## 2021-06-02 ENCOUNTER — LAB (OUTPATIENT)
Dept: LAB | Facility: HOSPITAL | Age: 86
End: 2021-06-02

## 2021-06-02 DIAGNOSIS — E78.2 MIXED HYPERLIPIDEMIA: ICD-10-CM

## 2021-06-02 DIAGNOSIS — M81.0 AGE-RELATED OSTEOPOROSIS WITHOUT CURRENT PATHOLOGICAL FRACTURE: ICD-10-CM

## 2021-06-02 DIAGNOSIS — I10 BENIGN ESSENTIAL HYPERTENSION: ICD-10-CM

## 2021-06-02 LAB
25(OH)D3 SERPL-MCNC: 41.5 NG/ML (ref 30–100)
ALBUMIN SERPL-MCNC: 4.4 G/DL (ref 3.5–5.2)
ALBUMIN/GLOB SERPL: 1.9 G/DL
ALP SERPL-CCNC: 57 U/L (ref 39–117)
ALT SERPL W P-5'-P-CCNC: 14 U/L (ref 1–33)
ANION GAP SERPL CALCULATED.3IONS-SCNC: 10.9 MMOL/L (ref 5–15)
AST SERPL-CCNC: 20 U/L (ref 1–32)
BASOPHILS # BLD AUTO: 0.06 10*3/MM3 (ref 0–0.2)
BASOPHILS NFR BLD AUTO: 0.6 % (ref 0–1.5)
BILIRUB SERPL-MCNC: 0.4 MG/DL (ref 0–1.2)
BUN SERPL-MCNC: 13 MG/DL (ref 8–23)
BUN/CREAT SERPL: 18.8 (ref 7–25)
CALCIUM SPEC-SCNC: 9 MG/DL (ref 8.6–10.5)
CHLORIDE SERPL-SCNC: 100 MMOL/L (ref 98–107)
CHOLEST SERPL-MCNC: 138 MG/DL (ref 0–200)
CO2 SERPL-SCNC: 22.1 MMOL/L (ref 22–29)
CREAT SERPL-MCNC: 0.69 MG/DL (ref 0.57–1)
DEPRECATED RDW RBC AUTO: 41.9 FL (ref 37–54)
EOSINOPHIL # BLD AUTO: 0.08 10*3/MM3 (ref 0–0.4)
EOSINOPHIL NFR BLD AUTO: 0.7 % (ref 0.3–6.2)
ERYTHROCYTE [DISTWIDTH] IN BLOOD BY AUTOMATED COUNT: 12.1 % (ref 12.3–15.4)
GFR SERPL CREATININE-BSD FRML MDRD: 81 ML/MIN/1.73
GLOBULIN UR ELPH-MCNC: 2.3 GM/DL
GLUCOSE SERPL-MCNC: 90 MG/DL (ref 65–99)
HCT VFR BLD AUTO: 37.2 % (ref 34–46.6)
HDLC SERPL-MCNC: 69 MG/DL (ref 40–60)
HGB BLD-MCNC: 13 G/DL (ref 12–15.9)
IMM GRANULOCYTES # BLD AUTO: 0.07 10*3/MM3 (ref 0–0.05)
IMM GRANULOCYTES NFR BLD AUTO: 0.7 % (ref 0–0.5)
LDLC SERPL CALC-MCNC: 57 MG/DL (ref 0–100)
LDLC/HDLC SERPL: 0.84 {RATIO}
LYMPHOCYTES # BLD AUTO: 1.38 10*3/MM3 (ref 0.7–3.1)
LYMPHOCYTES NFR BLD AUTO: 12.9 % (ref 19.6–45.3)
MCH RBC QN AUTO: 33.2 PG (ref 26.6–33)
MCHC RBC AUTO-ENTMCNC: 34.9 G/DL (ref 31.5–35.7)
MCV RBC AUTO: 94.9 FL (ref 79–97)
MONOCYTES # BLD AUTO: 0.89 10*3/MM3 (ref 0.1–0.9)
MONOCYTES NFR BLD AUTO: 8.3 % (ref 5–12)
NEUTROPHILS NFR BLD AUTO: 76.8 % (ref 42.7–76)
NEUTROPHILS NFR BLD AUTO: 8.24 10*3/MM3 (ref 1.7–7)
NRBC BLD AUTO-RTO: 0 /100 WBC (ref 0–0.2)
PLATELET # BLD AUTO: 270 10*3/MM3 (ref 140–450)
PMV BLD AUTO: 10.2 FL (ref 6–12)
POTASSIUM SERPL-SCNC: 4.1 MMOL/L (ref 3.5–5.2)
PROT SERPL-MCNC: 6.7 G/DL (ref 6–8.5)
RBC # BLD AUTO: 3.92 10*6/MM3 (ref 3.77–5.28)
SODIUM SERPL-SCNC: 133 MMOL/L (ref 136–145)
TRIGL SERPL-MCNC: 55 MG/DL (ref 0–150)
VLDLC SERPL-MCNC: 12 MG/DL (ref 5–40)
WBC # BLD AUTO: 10.72 10*3/MM3 (ref 3.4–10.8)

## 2021-06-02 PROCEDURE — 82306 VITAMIN D 25 HYDROXY: CPT

## 2021-06-02 PROCEDURE — 80061 LIPID PANEL: CPT

## 2021-06-02 PROCEDURE — 85025 COMPLETE CBC W/AUTO DIFF WBC: CPT

## 2021-06-02 PROCEDURE — 80053 COMPREHEN METABOLIC PANEL: CPT

## 2021-06-03 ENCOUNTER — OFFICE VISIT (OUTPATIENT)
Dept: INTERNAL MEDICINE | Facility: CLINIC | Age: 86
End: 2021-06-03

## 2021-06-03 VITALS
HEART RATE: 84 BPM | TEMPERATURE: 97.8 F | SYSTOLIC BLOOD PRESSURE: 112 MMHG | WEIGHT: 115.2 LBS | DIASTOLIC BLOOD PRESSURE: 64 MMHG | BODY MASS INDEX: 21.75 KG/M2 | HEIGHT: 61 IN

## 2021-06-03 DIAGNOSIS — M17.0 PRIMARY OSTEOARTHRITIS OF BOTH KNEES: ICD-10-CM

## 2021-06-03 DIAGNOSIS — M81.0 AGE-RELATED OSTEOPOROSIS WITHOUT CURRENT PATHOLOGICAL FRACTURE: ICD-10-CM

## 2021-06-03 DIAGNOSIS — Z00.00 PREVENTATIVE HEALTH CARE: Primary | ICD-10-CM

## 2021-06-03 DIAGNOSIS — M41.20 IDIOPATHIC SCOLIOSIS AND KYPHOSCOLIOSIS: ICD-10-CM

## 2021-06-03 DIAGNOSIS — K58.1 IRRITABLE BOWEL SYNDROME WITH CONSTIPATION: ICD-10-CM

## 2021-06-03 DIAGNOSIS — E78.2 MIXED HYPERLIPIDEMIA: ICD-10-CM

## 2021-06-03 DIAGNOSIS — I10 BENIGN ESSENTIAL HYPERTENSION: ICD-10-CM

## 2021-06-03 PROCEDURE — G0439 PPPS, SUBSEQ VISIT: HCPCS | Performed by: INTERNAL MEDICINE

## 2021-06-03 NOTE — PROGRESS NOTES
QUICK REFERENCE INFORMATION:  The ABCs of the Annual Wellness Visit    Subsequent Medicare Wellness Visit    HEALTH RISK ASSESSMENT    1935    Recent Hospitalizations:  No hospitalization(s) within the last year..        Current Medical Providers:  Patient Care Team:  Juan Carlos Dunn MD as PCP - General (Internal Medicine)        Smoking Status:  Social History     Tobacco Use   Smoking Status Never Smoker   Smokeless Tobacco Never Used       Alcohol Consumption:  Social History     Substance and Sexual Activity   Alcohol Use No       Depression Screen:   PHQ-2/PHQ-9 Depression Screening 6/3/2021   Little interest or pleasure in doing things 0   Feeling down, depressed, or hopeless 1   Trouble falling or staying asleep, or sleeping too much -   Feeling tired or having little energy -   Poor appetite or overeating -   Feeling bad about yourself - or that you are a failure or have let yourself or your family down -   Trouble concentrating on things, such as reading the newspaper or watching television -   Moving or speaking so slowly that other people could have noticed. Or the opposite - being so fidgety or restless that you have been moving around a lot more than usual -   Thoughts that you would be better off dead, or of hurting yourself in some way -   Total Score 1   If you checked off any problems, how difficult have these problems made it for you to do your work, take care of things at home, or get along with other people? -       Health Habits and Functional and Cognitive Screening:  Functional & Cognitive Status 6/3/2021   Do you have difficulty preparing food and eating? No   Do you have difficulty bathing yourself, getting dressed or grooming yourself? Yes   Do you have difficulty using the toilet? No   Do you have difficulty moving around from place to place? No   Do you have trouble with steps or getting out of a bed or a chair? Yes   Current Diet Well Balanced Diet   Dental Exam Up to date    Eye Exam Up to date   Exercise (times per week) 1 times per week   Current Exercise Activities Include (No Data)   Do you need help using the phone?  No   Are you deaf or do you have serious difficulty hearing?  Yes   Do you need help with transportation? Yes   Do you need help shopping? Yes   Do you need help preparing meals?  Yes   Do you need help with housework?  Yes   Do you need help with laundry? Yes   Do you need help taking your medications? No   Do you need help managing money? No   Do you ever drive or ride in a car without wearing a seat belt? No   Have you felt unusual stress, anger or loneliness in the last month? Yes   Who do you live with? Community   If you need help, do you have trouble finding someone available to you? No   Have you been bothered in the last four weeks by sexual problems? No   Do you have difficulty concentrating, remembering or making decisions? Yes       Fall Risk Screen:  LEANN Fall Risk Assessment was completed, and patient is at MODERATE risk for falls. Assessment completed on:6/3/2021    ACE III MINI   ATTENTION  What is the year: correct  What is the month of the year: correct  What is the day of the week?: correct  What is the date?: correct  MEMORY  Repeat address three times, only score third attempt: [address] [place], [place]: 6  HOW MANY ANIMALS DID THE PATIENT NAME  Verbal Fluency -- Animal Names (0-25): 17-21  CLOCK DRAWING  Clock Drawing: All Correct  MEMORY RECALL  Tell me what you remember about that name and address we were repeating at the beginning: 3  ACE TOTAL SCORE  Total ACE Score - <25/30 strongly suggests cognitive impairment; <21/30 almost certainly shows dementia: 24    Does the patient have evidence of cognitive impairment? No    Aspirin use counseling: Taking ASA appropriately as indicated    Recent Lab Results:  CMP:  Lab Results   Component Value Date    BUN 13 06/02/2021    CREATININE 0.69 06/02/2021    EGFRIFNONA 81  06/02/2021    BCR 18.8 06/02/2021     (L) 06/02/2021    K 4.1 06/02/2021    CO2 22.1 06/02/2021    CALCIUM 9.0 06/02/2021    ALBUMIN 4.40 06/02/2021    BILITOT 0.4 06/02/2021    ALKPHOS 57 06/02/2021    AST 20 06/02/2021    ALT 14 06/02/2021     HbA1c:  Lab Results   Component Value Date    HGBA1C 5.48 11/26/2019     Microalbumin:  Lab Results   Component Value Date    MICROALBUR 6.3 03/13/2019     Lipid Panel  Lab Results   Component Value Date    CHOL 138 06/02/2021    TRIG 55 06/02/2021    HDL 69 (H) 06/02/2021    LDL 57 06/02/2021    AST 20 06/02/2021    ALT 14 06/02/2021       Visual Acuity:  No exam data present    Age-appropriate Screening Schedule:  Refer to the list below for future screening recommendations based on patient's age, sex and/or medical conditions. Orders for these recommended tests are listed in the plan section. The patient has been provided with a written plan.    Health Maintenance   Topic Date Due   • TDAP/TD VACCINES (1 - Tdap) Never done   • ZOSTER VACCINE (2 of 3) 04/14/2009   • DXA SCAN  07/17/2021   • INFLUENZA VACCINE  08/01/2021   • LIPID PANEL  06/02/2022        Subjective   History of Present Illness    Megan Garcia is a 86 y.o. female who presents for a Subsequent Wellness Visit.    CHRONIC CONDITIONS    The following portions of the patient's history were reviewed and updated as appropriate: allergies, current medications, past family history, past medical history, past social history, past surgical history and problem list.    Outpatient Medications Prior to Visit   Medication Sig Dispense Refill   • acetaminophen (TYLENOL 8 HOUR ARTHRITIS PAIN) 650 MG 8 hr tablet Take 1 tablet by mouth Daily.     • acetaminophen (TYLENOL) 500 MG tablet Take 500 mg by mouth every night at bedtime. Takes 1 tablet with breakfast  And 1 tablet w/supper     • amLODIPine (NORVASC) 10 MG tablet TAKE ONE TABLET BY MOUTH DAILY 90 tablet 2   • ASPIRIN 81 PO Take 81 mg by mouth Daily.     •  atorvastatin (LIPITOR) 20 MG tablet Take 1 tablet by mouth Daily. 90 tablet 3   • CALCIUM PO Take 1 tablet by mouth Daily. 2 gummies  Tries to take it daily     • clopidogrel (PLAVIX) 75 MG tablet TAKE 1 TABLET BY MOUTH  DAILY 90 tablet 3   • diclofenac (VOLTAREN) 1 % gel gel Apply 4 g topically to the appropriate area as directed 4 (Four) Times a Day As Needed (pain). 60 g 1   • docusate sodium (COLACE) 100 MG capsule Take 100 mg by mouth Daily.     • fexofenadine (ALLEGRA) 180 MG tablet Take 180 mg by mouth Daily.     • lisinopril (PRINIVIL,ZESTRIL) 40 MG tablet TAKE 1 TABLET BY MOUTH  DAILY 90 tablet 3   • Multiple Vitamins-Minerals (MULTIVITAMIN ADULT PO) Take 1 tablet by mouth Daily.     • vitamin C (ASCORBIC ACID) 500 MG tablet Take 1 tablet by mouth Daily.       No facility-administered medications prior to visit.       Patient Active Problem List   Diagnosis   • Allergic rhinitis   • At risk for falls   • Ataxic gait   • Benign essential hypertension   • Idiopathic scoliosis and kyphoscoliosis   • Irritable bowel syndrome with constipation   • Mixed hyperlipidemia   • Non-allergic rhinitis   • Osteoarthritis   • Osteoporosis   • Urinary incontinence   • Primary osteoarthritis of both knees   • Primary osteoarthritis of right hip       Advance Care Planning:  ACP discussion was held with the patient during this visit. Patient has an advance directive in EMR which is still valid.     Identification of Risk Factors:  Risk factors include: Dementia/Memory .    Review of Systems   Constitutional: Negative for chills, fatigue and fever.   HENT: Negative for congestion, ear pain and sinus pressure.    Respiratory: Negative for cough, chest tightness, shortness of breath and wheezing.    Cardiovascular: Negative for chest pain and palpitations.   Gastrointestinal: Positive for constipation. Negative for abdominal pain and blood in stool.   Skin: Negative for color change.   Allergic/Immunologic: Negative for  "environmental allergies.   Neurological: Negative for dizziness, speech difficulty and headaches.   Psychiatric/Behavioral: Negative for confusion. The patient is not nervous/anxious.        Compared to one year ago, the patient feels her physical health is better.  Compared to one year ago, the patient feels her mental health is the same.    Objective     Physical Exam  Vitals reviewed.   Constitutional:       Appearance: She is well-developed and normal weight.   HENT:      Head: Normocephalic and atraumatic.      Right Ear: Tympanic membrane and ear canal normal.      Left Ear: Tympanic membrane and ear canal normal.      Mouth/Throat:      Pharynx: Oropharynx is clear. No posterior oropharyngeal erythema.   Neck:      Vascular: No carotid bruit.   Cardiovascular:      Rate and Rhythm: Normal rate and regular rhythm.      Heart sounds: Normal heart sounds. No murmur heard.     Pulmonary:      Effort: Pulmonary effort is normal.      Breath sounds: Normal breath sounds.   Abdominal:      General: Bowel sounds are normal.      Palpations: Abdomen is soft. There is no mass.      Tenderness: There is no abdominal tenderness.   Musculoskeletal:      Cervical back: Normal range of motion. Rigidity present.      Thoracic back: Scoliosis present.      Lumbar back: Scoliosis present.      Left lower leg: Edema present.   Skin:     General: Skin is warm and dry.   Neurological:      Mental Status: She is alert and oriented to person, place, and time.      Cranial Nerves: No cranial nerve deficit.      Motor: No weakness.      Gait: Gait normal.   Psychiatric:         Mood and Affect: Mood normal.         Thought Content: Thought content normal.          Procedures     Vitals:    06/03/21 0949   BP: 112/64   BP Location: Left arm   Patient Position: Sitting   Cuff Size: Adult   Pulse: 84   Temp: 97.8 °F (36.6 °C)   Weight: 52.3 kg (115 lb 3.2 oz)   Height: 154 cm (60.63\")   PainSc:   5   PainLoc: Knee       Patient's Body " mass index is 22.03 kg/m². indicating that she is within normal range (BMI 18.5-24.9). No BMI management plan needed..      Assessment/Plan   Problem List Items Addressed This Visit        Cardiac and Vasculature    Benign essential hypertension    Relevant Medications    lisinopril (PRINIVIL,ZESTRIL) 40 MG tablet    amLODIPine (NORVASC) 10 MG tablet    Mixed hyperlipidemia    Relevant Medications    atorvastatin (LIPITOR) 20 MG tablet       Gastrointestinal Abdominal     Irritable bowel syndrome with constipation       Musculoskeletal and Injuries    Idiopathic scoliosis and kyphoscoliosis    Osteoporosis    Primary osteoarthritis of both knees      Other Visit Diagnoses     Preventative health care    -  Primary        Patient Self-Management and Personalized Health Advice  The patient has been provided with information about: diet, exercise, prevention of cardiac or vascular disease and fall prevention and preventive services including:   · Annual Wellness Visit (AWV).    Outpatient Encounter Medications as of 6/3/2021   Medication Sig Dispense Refill   • acetaminophen (TYLENOL 8 HOUR ARTHRITIS PAIN) 650 MG 8 hr tablet Take 1 tablet by mouth Daily.     • acetaminophen (TYLENOL) 500 MG tablet Take 500 mg by mouth every night at bedtime. Takes 1 tablet with breakfast  And 1 tablet w/supper     • amLODIPine (NORVASC) 10 MG tablet TAKE ONE TABLET BY MOUTH DAILY 90 tablet 2   • ASPIRIN 81 PO Take 81 mg by mouth Daily.     • atorvastatin (LIPITOR) 20 MG tablet Take 1 tablet by mouth Daily. 90 tablet 3   • CALCIUM PO Take 1 tablet by mouth Daily. 2 gummies  Tries to take it daily     • clopidogrel (PLAVIX) 75 MG tablet TAKE 1 TABLET BY MOUTH  DAILY 90 tablet 3   • diclofenac (VOLTAREN) 1 % gel gel Apply 4 g topically to the appropriate area as directed 4 (Four) Times a Day As Needed (pain). 60 g 1   • docusate sodium (COLACE) 100 MG capsule Take 100 mg by mouth Daily.     • fexofenadine (ALLEGRA) 180 MG tablet Take 180  mg by mouth Daily.     • lisinopril (PRINIVIL,ZESTRIL) 40 MG tablet TAKE 1 TABLET BY MOUTH  DAILY 90 tablet 3   • Multiple Vitamins-Minerals (MULTIVITAMIN ADULT PO) Take 1 tablet by mouth Daily.     • vitamin C (ASCORBIC ACID) 500 MG tablet Take 1 tablet by mouth Daily.       No facility-administered encounter medications on file as of 6/3/2021.     Plan    Overall she is doing much better than last year.  Some of this is due to better management of her bowel issues and some due to the fact that Covid restrictions are going away at her facility and there are this more exercise and social activities.  She is fully vaccinated against COVID-19.    Blood pressure is well controlled on lisinopril and amlodipine.    Lipids are well controlled on atorvastatin and healthy diet.    Scoliosis persists but is not limiting.    She will continue regular physical activity calcium and vitamin D for osteoporosis.    IBS is managed with every other day apricots.  She also received benefit from physical therapy for her pelvic floor dysfunction.    Reviewed use of high risk medication in the elderly: yes  Reviewed for potential of harmful drug interactions in the elderly: yes    Follow Up:  Return in about 6 months (around 12/3/2021) for follow up.     There are no Patient Instructions on file for this visit.    An After Visit Summary and PPPS with all of these plans were given to the patient.

## 2021-08-02 ENCOUNTER — LAB (OUTPATIENT)
Dept: LAB | Facility: HOSPITAL | Age: 86
End: 2021-08-02

## 2021-08-02 ENCOUNTER — OFFICE VISIT (OUTPATIENT)
Dept: INTERNAL MEDICINE | Facility: CLINIC | Age: 86
End: 2021-08-02

## 2021-08-02 VITALS
HEIGHT: 61 IN | DIASTOLIC BLOOD PRESSURE: 82 MMHG | HEART RATE: 84 BPM | BODY MASS INDEX: 21.52 KG/M2 | TEMPERATURE: 97.8 F | WEIGHT: 114 LBS | SYSTOLIC BLOOD PRESSURE: 144 MMHG

## 2021-08-02 DIAGNOSIS — N30.00 ACUTE CYSTITIS WITHOUT HEMATURIA: ICD-10-CM

## 2021-08-02 DIAGNOSIS — R30.0 DYSURIA: ICD-10-CM

## 2021-08-02 DIAGNOSIS — N30.00 ACUTE CYSTITIS WITHOUT HEMATURIA: Primary | ICD-10-CM

## 2021-08-02 LAB
BILIRUB BLD-MCNC: NEGATIVE MG/DL
CLARITY, POC: ABNORMAL
COLOR UR: YELLOW
GLUCOSE UR STRIP-MCNC: NEGATIVE MG/DL
KETONES UR QL: ABNORMAL
LEUKOCYTE EST, POC: ABNORMAL
NITRITE UR-MCNC: NEGATIVE MG/ML
PH UR: 7 [PH] (ref 5–8)
PROT UR STRIP-MCNC: ABNORMAL MG/DL
RBC # UR STRIP: ABNORMAL /UL
SP GR UR: 1.02 (ref 1–1.03)
UROBILINOGEN UR QL: NORMAL

## 2021-08-02 PROCEDURE — 87086 URINE CULTURE/COLONY COUNT: CPT

## 2021-08-02 PROCEDURE — 87077 CULTURE AEROBIC IDENTIFY: CPT

## 2021-08-02 PROCEDURE — 99213 OFFICE O/P EST LOW 20 MIN: CPT | Performed by: INTERNAL MEDICINE

## 2021-08-02 PROCEDURE — 87186 SC STD MICRODIL/AGAR DIL: CPT

## 2021-08-02 PROCEDURE — 81003 URINALYSIS AUTO W/O SCOPE: CPT | Performed by: INTERNAL MEDICINE

## 2021-08-02 RX ORDER — SULFAMETHOXAZOLE AND TRIMETHOPRIM 800; 160 MG/1; MG/1
1 TABLET ORAL 2 TIMES DAILY
Qty: 10 TABLET | Refills: 0 | Status: SHIPPED | OUTPATIENT
Start: 2021-08-02 | End: 2021-12-08

## 2021-08-02 NOTE — PROGRESS NOTES
[facility]     Megan Garcia  1935   [id number]      Patient Care Team:  Juna Carlos Dunn MD as PCP - General (Internal Medicine)    Chief Complaint::   Chief Complaint   Patient presents with   • Difficulty Urinating        HPI  Ms. Garcia comes in complaining of not being able to control urination for the past day.  There is no fevers or chills.  Previously her incontinence had been better with the help of physical therapy.  There is no dysuria.    Chronic Conditions:      Patient Active Problem List   Diagnosis   • Allergic rhinitis   • At risk for falls   • Ataxic gait   • Benign essential hypertension   • Idiopathic scoliosis and kyphoscoliosis   • Irritable bowel syndrome with constipation   • Mixed hyperlipidemia   • Non-allergic rhinitis   • Osteoarthritis   • Osteoporosis   • Urinary incontinence   • Primary osteoarthritis of both knees   • Primary osteoarthritis of right hip        Past Medical History:   Diagnosis Date   • Allergic    • Arthritis    • Hyperlipidemia    • Hypertension    • Intervertebral disc displacement    • Left periventricular lacunar stroke 10/19/2015       Past Surgical History:   Procedure Laterality Date   • LAMINECTOMY  1974   • TOTAL KNEE ARTHROPLASTY Left 2004       Family History   Problem Relation Age of Onset   • Coronary artery disease Father        Social History     Socioeconomic History   • Marital status:      Spouse name: Not on file   • Number of children: Not on file   • Years of education: Not on file   • Highest education level: Not on file   Tobacco Use   • Smoking status: Never Smoker   • Smokeless tobacco: Never Used   Substance and Sexual Activity   • Alcohol use: No   • Drug use: No   • Sexual activity: Defer       No Known Allergies      Current Outpatient Medications:   •  acetaminophen (TYLENOL 8 HOUR ARTHRITIS PAIN) 650 MG 8 hr tablet, Take 1 tablet by mouth Daily., Disp: , Rfl:   •  amLODIPine (NORVASC) 10 MG tablet, TAKE  "ONE TABLET BY MOUTH DAILY, Disp: 90 tablet, Rfl: 2  •  atorvastatin (LIPITOR) 20 MG tablet, Take 1 tablet by mouth Daily., Disp: 90 tablet, Rfl: 3  •  CALCIUM PO, Take 1 tablet by mouth Daily. 2 gummies  Tries to take it daily, Disp: , Rfl:   •  clopidogrel (PLAVIX) 75 MG tablet, TAKE 1 TABLET BY MOUTH  DAILY, Disp: 90 tablet, Rfl: 3  •  fexofenadine (ALLEGRA) 180 MG tablet, Take 180 mg by mouth Daily., Disp: , Rfl:   •  lisinopril (PRINIVIL,ZESTRIL) 40 MG tablet, TAKE 1 TABLET BY MOUTH  DAILY, Disp: 90 tablet, Rfl: 3  •  Multiple Vitamins-Minerals (MULTIVITAMIN ADULT PO), Take 1 tablet by mouth Daily., Disp: , Rfl:   •  vitamin C (ASCORBIC ACID) 500 MG tablet, Take 1 tablet by mouth Daily., Disp: , Rfl:   •  ASPIRIN 81 PO, Take 81 mg by mouth Daily., Disp: , Rfl:   •  diclofenac (VOLTAREN) 1 % gel gel, Apply 4 g topically to the appropriate area as directed 4 (Four) Times a Day As Needed (pain)., Disp: 60 g, Rfl: 1  •  sulfamethoxazole-trimethoprim (BACTRIM DS,SEPTRA DS) 800-160 MG per tablet, Take 1 tablet by mouth 2 (Two) Times a Day., Disp: 10 tablet, Rfl: 0    Review of Systems   Constitutional: Negative.    Respiratory: Negative.  Negative for chest tightness and shortness of breath.    Cardiovascular: Negative.  Negative for chest pain.   Gastrointestinal: Negative for abdominal pain, blood in stool, constipation and diarrhea.   Genitourinary: Positive for difficulty urinating.        Vital Signs  Vitals:    08/02/21 1111   BP: 144/82   BP Location: Left arm   Patient Position: Sitting   Cuff Size: Adult   Pulse: 84   Temp: 97.8 °F (36.6 °C)   Weight: 51.7 kg (114 lb)   Height: 154 cm (60.63\")   PainSc: 0-No pain       Physical Exam  Vitals reviewed.   Constitutional:       Appearance: She is well-developed.   HENT:      Head: Normocephalic and atraumatic.   Cardiovascular:      Rate and Rhythm: Normal rate and regular rhythm.      Heart sounds: Normal heart sounds. No murmur heard.     Pulmonary:      " Effort: Pulmonary effort is normal.      Breath sounds: Normal breath sounds.   Neurological:      Mental Status: She is alert and oriented to person, place, and time.          Procedures    ACE III MINI             Assessment/Plan:    Diagnoses and all orders for this visit:    1. Acute cystitis without hematuria (Primary)  -     Urine Culture - Urine, Urine, Clean Catch; Future    2. Dysuria  -     POC Urinalysis Dipstick, Automated    Other orders  -     sulfamethoxazole-trimethoprim (BACTRIM DS,SEPTRA DS) 800-160 MG per tablet; Take 1 tablet by mouth 2 (Two) Times a Day.  Dispense: 10 tablet; Refill: 0          Plan of care reviewed with patient at the conclusion of today's visit. Education was provided regarding diagnosis, management, and any prescribed or recommended OTC medications.Patient verbalizes understanding of and agreement with management plan.         Juan Carlos Dunn MD

## 2021-08-04 LAB — BACTERIA SPEC AEROBE CULT: ABNORMAL

## 2021-08-06 RX ORDER — CLOPIDOGREL BISULFATE 75 MG/1
75 TABLET ORAL DAILY
Qty: 90 TABLET | Refills: 3 | Status: SHIPPED | OUTPATIENT
Start: 2021-08-06 | End: 2022-09-02

## 2021-08-09 ENCOUNTER — TELEPHONE (OUTPATIENT)
Dept: INTERNAL MEDICINE | Facility: CLINIC | Age: 86
End: 2021-08-09

## 2021-08-09 NOTE — TELEPHONE ENCOUNTER
PT CALLED TO REQUEST TO HAVE RX LISINOPRIL CHANGED TO ANOTHER RX, BECAUSE RX IS CAUSING HER TO COUGH AND ITS BECOMING BOTHERSOME.    PLEASE ADVISE.  CALL BACK:[phone]

## 2021-08-09 NOTE — TELEPHONE ENCOUNTER
I will substitute olmesartan, which doesn't cause a cough.  Does she want me to send that to mail order?

## 2021-08-19 ENCOUNTER — TELEPHONE (OUTPATIENT)
Dept: INTERNAL MEDICINE | Facility: CLINIC | Age: 86
End: 2021-08-19

## 2021-08-19 RX ORDER — OLMESARTAN MEDOXOMIL 40 MG/1
40 TABLET ORAL DAILY
Qty: 90 TABLET | Refills: 1 | Status: SHIPPED | OUTPATIENT
Start: 2021-08-19 | End: 2021-08-19 | Stop reason: SDUPTHER

## 2021-08-19 RX ORDER — OLMESARTAN MEDOXOMIL 40 MG/1
40 TABLET ORAL DAILY
Qty: 30 TABLET | Refills: 1 | Status: SHIPPED | OUTPATIENT
Start: 2021-08-19 | End: 2021-12-08 | Stop reason: SDUPTHER

## 2021-08-19 NOTE — TELEPHONE ENCOUNTER
Caller: Megan Garcia    Relationship: Self    Best call back number: [phone]    What is the best time to reach you: ANYTIME    Who are you requesting to speak with (clinical staff, provider,  specific staff member):CLINICAL STAFF    What was the call regarding: PATIENT STATES THAT SHE NEVER RECEIVED A PRESCRIPTION FOR MEDICATION REPLACING LISINOPRIL. PLEASE ADVISE    Do you require a callback: YES

## 2021-09-08 ENCOUNTER — TELEPHONE (OUTPATIENT)
Dept: INTERNAL MEDICINE | Facility: CLINIC | Age: 86
End: 2021-09-08

## 2021-09-08 NOTE — TELEPHONE ENCOUNTER
Patient verbalized understanding and will get tested either on Thursday or Friday which would be 4-5 days from exposure

## 2021-09-08 NOTE — TELEPHONE ENCOUNTER
PATIENT CALLED AND STATED SHE HAS BEEN EXPOSED BY COVID 19 AT A DINNER TABLE.  SHE HAD SAT WITH SOMEONE AT TABLE THAT TESTED POSITIVE.  PATIENT HAS NO SYMPTOMS AT THIS TIME BUT IS REQUESTING A CALL BACK FROM PCP TO BE ADVISED ON WHAT TO DO NEXT.    CALL BACK NUMBER [phone]

## 2021-09-14 ENCOUNTER — TELEPHONE (OUTPATIENT)
Dept: INTERNAL MEDICINE | Facility: CLINIC | Age: 86
End: 2021-09-14

## 2021-09-14 NOTE — TELEPHONE ENCOUNTER
Caller: Megan Garcia    Relationship: Self    Best call back number:[phone]     What was the call regarding: PATIENT CALLED STATING THAT SHE HAS A COVID TEST ON Friday AND IT CAME BACK NEGATIVE.  PATIENT ALSO STATED SHE HAD THE FLU SHOT TODAY.    Do you require a callback:

## 2021-12-08 ENCOUNTER — OFFICE VISIT (OUTPATIENT)
Dept: INTERNAL MEDICINE | Facility: CLINIC | Age: 86
End: 2021-12-08

## 2021-12-08 VITALS
HEART RATE: 88 BPM | TEMPERATURE: 98 F | HEIGHT: 61 IN | BODY MASS INDEX: 21.8 KG/M2 | SYSTOLIC BLOOD PRESSURE: 138 MMHG | DIASTOLIC BLOOD PRESSURE: 74 MMHG

## 2021-12-08 DIAGNOSIS — R19.4 CHANGE IN BOWEL HABITS: ICD-10-CM

## 2021-12-08 DIAGNOSIS — I10 BENIGN ESSENTIAL HYPERTENSION: Primary | ICD-10-CM

## 2021-12-08 DIAGNOSIS — M41.20 IDIOPATHIC SCOLIOSIS AND KYPHOSCOLIOSIS: ICD-10-CM

## 2021-12-08 DIAGNOSIS — E78.2 MIXED HYPERLIPIDEMIA: ICD-10-CM

## 2021-12-08 DIAGNOSIS — J30.1 SEASONAL ALLERGIC RHINITIS DUE TO POLLEN: ICD-10-CM

## 2021-12-08 PROCEDURE — 99214 OFFICE O/P EST MOD 30 MIN: CPT | Performed by: INTERNAL MEDICINE

## 2021-12-08 RX ORDER — OLMESARTAN MEDOXOMIL 40 MG/1
40 TABLET ORAL DAILY
Qty: 30 TABLET | Refills: 1 | Status: SHIPPED | OUTPATIENT
Start: 2021-12-08 | End: 2022-04-03

## 2021-12-08 NOTE — PROGRESS NOTES
[facility]     Megan Garcia  1935   [id number]      Patient Care Team:  Juan Carlos Dunn MD as PCP - General (Internal Medicine)    Chief Complaint::   Chief Complaint   Patient presents with   • Hyperlipidemia   • Hypertension   • Fecal Incontinence   • Fall     Sat - twisted left ankle        HPI  She comes in for follow-up of her hypertension, hyperlipidemia, scoliosis and allergic rhinitis. She is accompanied by an adult female.     Bowel dysfunction   She has been experiencing constipation over the past several months. She has been taking stool softeners as well as laxatives to help. However, she notes getting diarrhea when she takes the laxatives, so she then takes anti-diarrheal medicines. She has tried Dulcolax which worked for some time. She has not been taking any laxatives or anti-diarrhea medicines lately. She is able to have a bowel movement, but she usually has to strain and feels like she does not fully empty her bowels. She notes having intermittent bowel incontinence when she does not empty her bowels completely. She the incontinence usually depends on how big of a meal she consumes. She used to dig her bowels out at times. Per adult female, she was emptying her bowels frequently when she was doing physical therapy. She was supposed to have a colonoscopy but she was not able to go to the appointment.     Hypertension  Her blood pressure is well controlled today.     Health maintenance  She is fully vaccinated against COVID-19.     Chronic Conditions:      Patient Active Problem List   Diagnosis   • Allergic rhinitis   • At risk for falls   • Ataxic gait   • Benign essential hypertension   • Idiopathic scoliosis and kyphoscoliosis   • Irritable bowel syndrome with constipation   • Mixed hyperlipidemia   • Non-allergic rhinitis   • Osteoarthritis   • Osteoporosis   • Urinary incontinence   • Primary osteoarthritis of both knees   • Primary osteoarthritis of right hip         Past Medical History:   Diagnosis Date   • Allergic    • Arthritis    • Hyperlipidemia    • Hypertension    • Intervertebral disc displacement    • Left periventricular lacunar stroke 10/19/2015       Past Surgical History:   Procedure Laterality Date   • LAMINECTOMY  1974   • TOTAL KNEE ARTHROPLASTY Left 2004       Family History   Problem Relation Age of Onset   • Coronary artery disease Father        Social History     Socioeconomic History   • Marital status:    Tobacco Use   • Smoking status: Never Smoker   • Smokeless tobacco: Never Used   Substance and Sexual Activity   • Alcohol use: No   • Drug use: No   • Sexual activity: Defer       No Known Allergies      Current Outpatient Medications:   •  acetaminophen (TYLENOL 8 HOUR ARTHRITIS PAIN) 650 MG 8 hr tablet, Take 1 tablet by mouth Daily., Disp: , Rfl:   •  amLODIPine (NORVASC) 10 MG tablet, TAKE ONE TABLET BY MOUTH DAILY, Disp: 90 tablet, Rfl: 2  •  ASPIRIN 81 PO, Take 81 mg by mouth Daily., Disp: , Rfl:   •  atorvastatin (LIPITOR) 20 MG tablet, Take 1 tablet by mouth Daily., Disp: 90 tablet, Rfl: 3  •  CALCIUM PO, Take 1 tablet by mouth Daily. 2 gummies  Tries to take it daily, Disp: , Rfl:   •  clopidogrel (PLAVIX) 75 MG tablet, TAKE 1 TABLET BY MOUTH  DAILY, Disp: 90 tablet, Rfl: 3  •  diclofenac (VOLTAREN) 1 % gel gel, Apply 4 g topically to the appropriate area as directed 4 (Four) Times a Day As Needed (pain)., Disp: 60 g, Rfl: 1  •  fexofenadine (ALLEGRA) 180 MG tablet, Take 180 mg by mouth Daily., Disp: , Rfl:   •  Multiple Vitamins-Minerals (MULTIVITAMIN ADULT PO), Take 1 tablet by mouth Daily., Disp: , Rfl:   •  olmesartan (BENICAR) 40 MG tablet, Take 1 tablet by mouth Daily., Disp: 30 tablet, Rfl: 1  •  vitamin C (ASCORBIC ACID) 500 MG tablet, Take 1 tablet by mouth Daily., Disp: , Rfl:     Review of Systems   Constitutional: Negative.    HENT: Negative.    Respiratory: Negative.    Cardiovascular: Negative.    Gastrointestinal:  "Positive for constipation and rectal pain.   Genitourinary: Negative.    Musculoskeletal: Negative.    All other systems reviewed and are negative.       Vital Signs  Vitals:    12/08/21 1057   BP: 138/74   BP Location: Left arm   Patient Position: Sitting   Cuff Size: Adult   Pulse: 88   Temp: 98 °F (36.7 °C)   Weight: Comment: W/C - can't stand today. Home weight today  107.6 lb   Height: 154 cm (60.63\")   PainSc:   5   PainLoc: Rectum       Physical Exam  Vitals and nursing note reviewed.   Constitutional:       Appearance: Normal appearance. She is well-developed.   HENT:      Head: Normocephalic and atraumatic.      Right Ear: External ear normal.      Left Ear: External ear normal.      Mouth/Throat:      Pharynx: No oropharyngeal exudate.   Eyes:      Conjunctiva/sclera: Conjunctivae normal.      Pupils: Pupils are equal, round, and reactive to light.   Cardiovascular:      Rate and Rhythm: Normal rate and regular rhythm.      Pulses: Normal pulses.      Heart sounds: No murmur heard.  No friction rub. No gallop.    Pulmonary:      Effort: Pulmonary effort is normal.      Breath sounds: Normal breath sounds. No wheezing or rhonchi.   Abdominal:      General: Bowel sounds are normal. There is no distension.      Palpations: Abdomen is soft.      Tenderness: There is no abdominal tenderness.   Skin:     General: Skin is warm and dry.      Findings: No rash.   Neurological:      Mental Status: She is alert and oriented to person, place, and time.      Cranial Nerves: No cranial nerve deficit.   Psychiatric:         Mood and Affect: Mood and affect normal.         Behavior: Behavior normal.         Thought Content: Thought content normal.         Judgment: Judgment normal.          Procedures    ACE III MINI             Assessment/Plan:    Diagnoses and all orders for this visit:    1. Benign essential hypertension (Primary)  - Blood pressure is well controlled on olmesartan.     2. Mixed hyperlipidemia  - Lipids " are well controlled on atorvastatin.     3. Idiopathic scoliosis and kyphoscoliosis  - Back pain is controlled with Tylenol as needed.     4. Seasonal allergic rhinitis due to pollen  - Allergies are controlled with fexofenadine.     5. Change in bowel habits  - She is referred back to GI. She had an appointment a year ago, but did not go because the physical therapy was helping. I suggested that she try Benefiber daily in the meantime.   -     Ambulatory Referral to Gastroenterology    Other orders  -     olmesartan (BENICAR) 40 MG tablet; Take 1 tablet by mouth Daily.  Dispense: 30 tablet; Refill: 1    She will follow up in 6 months.      Plan of care reviewed with patient at the conclusion of today's visit. Education was provided regarding diagnosis, management, and any prescribed or recommended OTC medications.Patient verbalizes understanding of and agreement with management plan.      Transcribed from ambient dictation for Juan Carlos Dunn MD by Gaurav MURILLO Rep.  12/08/21   13:55 EST    Patient verbalized consent to the visit recording.      Juan Carlos Dunn MD

## 2022-02-26 RX ORDER — AMLODIPINE BESYLATE 10 MG/1
TABLET ORAL
Qty: 90 TABLET | Refills: 2 | Status: SHIPPED | OUTPATIENT
Start: 2022-02-26 | End: 2022-11-09

## 2022-03-07 ENCOUNTER — TELEPHONE (OUTPATIENT)
Dept: INTERNAL MEDICINE | Facility: CLINIC | Age: 87
End: 2022-03-07

## 2022-03-07 ENCOUNTER — OFFICE VISIT (OUTPATIENT)
Dept: INTERNAL MEDICINE | Facility: CLINIC | Age: 87
End: 2022-03-07

## 2022-03-07 VITALS
WEIGHT: 110.8 LBS | HEART RATE: 80 BPM | HEIGHT: 61 IN | DIASTOLIC BLOOD PRESSURE: 72 MMHG | BODY MASS INDEX: 20.92 KG/M2 | TEMPERATURE: 96.8 F | SYSTOLIC BLOOD PRESSURE: 138 MMHG

## 2022-03-07 DIAGNOSIS — S09.90XD CLOSED HEAD INJURY, SUBSEQUENT ENCOUNTER: Primary | ICD-10-CM

## 2022-03-07 PROCEDURE — 99213 OFFICE O/P EST LOW 20 MIN: CPT | Performed by: INTERNAL MEDICINE

## 2022-03-07 RX ORDER — POLYETHYLENE GLYCOL 3350 17 G/17G
17 POWDER, FOR SOLUTION ORAL EVERY OTHER DAY
COMMUNITY

## 2022-03-07 NOTE — PROGRESS NOTES
[facility]     Megan Garcia  1935   [id number]      Patient Care Team:  Juan Carlos Dunn MD as PCP - General (Internal Medicine)    Chief Complaint::   Chief Complaint   Patient presents with   • Fall     Fell last Tues and hit head        HPI    The patient has consented to being recorded using [employer].    Megan Garcia is an 86-year-old female who presents today for a follow-up of a fall that occurred a week ago during which she struck her head. She is accompanied by an adult female.    Closed head injury  The adult female states the patient hit her forehead when falling on the floor on Tuesday afternoon, 03/01/2022. She did try to get up by herself. She states she called the EMS and after they evaluated her, they wanted her to go to the hospital but she declined. The patient reports this happened to her a lot where she loses her balance and falls. She states she fell in the bathroom before, but did not hit her head. She states she saw the eye doctor the next day. She is not having pain anywhere.     The patient states she saw her gastroenterologist and he was able to help with her bowel issues. She is taking MiraLAX every other day and Tums after meals. She reportedly has no issues related to medications.       Chronic Conditions:      Patient Active Problem List   Diagnosis   • Allergic rhinitis   • At risk for falls   • Ataxic gait   • Benign essential hypertension   • Idiopathic scoliosis and kyphoscoliosis   • Irritable bowel syndrome with constipation   • Mixed hyperlipidemia   • Non-allergic rhinitis   • Osteoarthritis   • Osteoporosis   • Urinary incontinence   • Primary osteoarthritis of both knees   • Primary osteoarthritis of right hip        Past Medical History:   Diagnosis Date   • Allergic    • Arthritis    • Hyperlipidemia    • Hypertension    • Intervertebral disc displacement    • Left periventricular lacunar stroke 10/19/2015       Past Surgical History:   Procedure  Laterality Date   • LAMINECTOMY  1974   • TOTAL KNEE ARTHROPLASTY Left 2004       Family History   Problem Relation Age of Onset   • Coronary artery disease Father        Social History     Socioeconomic History   • Marital status:    Tobacco Use   • Smoking status: Never Smoker   • Smokeless tobacco: Never Used   Substance and Sexual Activity   • Alcohol use: No   • Drug use: No   • Sexual activity: Defer       No Known Allergies      Current Outpatient Medications:   •  acetaminophen (TYLENOL 8 HOUR ARTHRITIS PAIN) 650 MG 8 hr tablet, Take 1 tablet by mouth Daily., Disp: , Rfl:   •  amLODIPine (NORVASC) 10 MG tablet, TAKE ONE TABLET BY MOUTH DAILY, Disp: 90 tablet, Rfl: 2  •  ASPIRIN 81 PO, Take 81 mg by mouth Daily., Disp: , Rfl:   •  atorvastatin (LIPITOR) 20 MG tablet, Take 1 tablet by mouth Daily., Disp: 90 tablet, Rfl: 3  •  CALCIUM PO, Take 1 tablet by mouth Daily. 2 gummies  Tries to take it daily, Disp: , Rfl:   •  clopidogrel (PLAVIX) 75 MG tablet, TAKE 1 TABLET BY MOUTH  DAILY, Disp: 90 tablet, Rfl: 3  •  diclofenac (VOLTAREN) 1 % gel gel, Apply 4 g topically to the appropriate area as directed 4 (Four) Times a Day As Needed (pain)., Disp: 60 g, Rfl: 1  •  fexofenadine (ALLEGRA) 180 MG tablet, Take 180 mg by mouth Daily., Disp: , Rfl:   •  Multiple Vitamins-Minerals (MULTIVITAMIN ADULT PO), Take 1 tablet by mouth Daily., Disp: , Rfl:   •  olmesartan (BENICAR) 40 MG tablet, Take 1 tablet by mouth Daily., Disp: 30 tablet, Rfl: 1  •  polyethylene glycol (MiraLax) 17 GM/SCOOP powder, Take 17 g by mouth Every Other Day., Disp: , Rfl:   •  vitamin C (ASCORBIC ACID) 500 MG tablet, Take 1 tablet by mouth Daily., Disp: , Rfl:     Review of Systems   A review of systems was performed, and the pertinent positives are noted in the HPI.    Vital Signs  Vitals:    03/07/22 1453   BP: 138/72   BP Location: Left arm   Patient Position: Sitting   Cuff Size: Adult   Pulse: 80   Temp: 96.8 °F (36 °C)   Weight: 50.3  "kg (110 lb 12.8 oz)   Height: 154 cm (60.63\")   PainSc: 0-No pain       Physical Exam  Vitals reviewed.   Constitutional:       Appearance: She is well-developed.   HENT:      Head: Normocephalic and atraumatic.   Cardiovascular:      Rate and Rhythm: Normal rate and regular rhythm.      Heart sounds: Normal heart sounds. No murmur heard.  Pulmonary:      Effort: Pulmonary effort is normal.      Breath sounds: Normal breath sounds.   Skin:     Comments: She has old bruising at the right forehead and temple. She has ecchymosis below the left eye.   Neurological:      Mental Status: She is alert and oriented to person, place, and time.      Cranial Nerves: No cranial nerve deficit.      Sensory: No sensory deficit.      Motor: No weakness.   Psychiatric:         Mood and Affect: Mood normal.         Behavior: Behavior normal.         Thought Content: Thought content normal.          Procedures    ACE III MINI             Assessment/Plan:    Diagnoses and all orders for this visit:    1. Closed head injury, subsequent encounter (Primary)        1. Closed head injury occurred 7 days ago.  - She is neurologically intact. The bruise is healing normally. There is no evidence of neurologic deficit. No further work-up is indicated.    Plan of care reviewed with patient at the conclusion of today's visit. Education was provided regarding diagnosis, management, and any prescribed or recommended OTC medications.Patient verbalizes understanding of and agreement with management plan.         Juan Carlos Dunn MD       Transcribed from ambient dictation for Juan Carlos Dunn MD by Tom Gruber.    03/08/22   13:10 EST    Patient verbalized consent to the visit recording.        "

## 2022-04-03 RX ORDER — OLMESARTAN MEDOXOMIL 40 MG/1
40 TABLET ORAL DAILY
Qty: 60 TABLET | Refills: 5 | Status: SHIPPED | OUTPATIENT
Start: 2022-04-03 | End: 2022-09-06

## 2022-05-31 ENCOUNTER — TELEPHONE (OUTPATIENT)
Dept: INTERNAL MEDICINE | Facility: CLINIC | Age: 87
End: 2022-05-31

## 2022-05-31 DIAGNOSIS — M81.0 AGE-RELATED OSTEOPOROSIS WITHOUT CURRENT PATHOLOGICAL FRACTURE: ICD-10-CM

## 2022-05-31 DIAGNOSIS — I10 BENIGN ESSENTIAL HYPERTENSION: Primary | ICD-10-CM

## 2022-05-31 DIAGNOSIS — E78.2 MIXED HYPERLIPIDEMIA: ICD-10-CM

## 2022-05-31 NOTE — TELEPHONE ENCOUNTER
Caller: YOLA FRAZIER    Relationship: Emergency Contact    Best call back number: [phone]    What orders are you requesting (i.e. lab or imaging): BLOOD WORK    In what timeframe would the patient need to come in: N/A    Where will you receive your lab/imaging services: N/A     Additional notes: YOLA STATED THAT PATIENT WOULD LIKE TO COME INTO OFFICE PRIOR TO APPOINTMENT TO GET BLOOD WORK DONE    PLEASE ADVISE

## 2022-06-03 ENCOUNTER — LAB (OUTPATIENT)
Dept: LAB | Facility: HOSPITAL | Age: 87
End: 2022-06-03

## 2022-06-03 DIAGNOSIS — M81.0 AGE-RELATED OSTEOPOROSIS WITHOUT CURRENT PATHOLOGICAL FRACTURE: ICD-10-CM

## 2022-06-03 DIAGNOSIS — I10 BENIGN ESSENTIAL HYPERTENSION: ICD-10-CM

## 2022-06-03 DIAGNOSIS — E78.2 MIXED HYPERLIPIDEMIA: ICD-10-CM

## 2022-06-03 LAB
25(OH)D3 SERPL-MCNC: 40 NG/ML (ref 30–100)
ALBUMIN SERPL-MCNC: 4.7 G/DL (ref 3.5–5.2)
ALBUMIN/GLOB SERPL: 2 G/DL
ALP SERPL-CCNC: 57 U/L (ref 39–117)
ALT SERPL W P-5'-P-CCNC: 12 U/L (ref 1–33)
ANION GAP SERPL CALCULATED.3IONS-SCNC: 12.4 MMOL/L (ref 5–15)
AST SERPL-CCNC: 16 U/L (ref 1–32)
BASOPHILS # BLD AUTO: 0.07 10*3/MM3 (ref 0–0.2)
BASOPHILS NFR BLD AUTO: 0.8 % (ref 0–1.5)
BILIRUB SERPL-MCNC: 0.5 MG/DL (ref 0–1.2)
BUN SERPL-MCNC: 14 MG/DL (ref 8–23)
BUN/CREAT SERPL: 17.3 (ref 7–25)
CALCIUM SPEC-SCNC: 9.4 MG/DL (ref 8.6–10.5)
CHLORIDE SERPL-SCNC: 100 MMOL/L (ref 98–107)
CHOLEST SERPL-MCNC: 195 MG/DL (ref 0–200)
CO2 SERPL-SCNC: 23.6 MMOL/L (ref 22–29)
CREAT SERPL-MCNC: 0.81 MG/DL (ref 0.57–1)
DEPRECATED RDW RBC AUTO: 40.2 FL (ref 37–54)
EGFRCR SERPLBLD CKD-EPI 2021: 70.4 ML/MIN/1.73
EOSINOPHIL # BLD AUTO: 0.09 10*3/MM3 (ref 0–0.4)
EOSINOPHIL NFR BLD AUTO: 1 % (ref 0.3–6.2)
ERYTHROCYTE [DISTWIDTH] IN BLOOD BY AUTOMATED COUNT: 11.9 % (ref 12.3–15.4)
GLOBULIN UR ELPH-MCNC: 2.4 GM/DL
GLUCOSE SERPL-MCNC: 90 MG/DL (ref 65–99)
HCT VFR BLD AUTO: 37.8 % (ref 34–46.6)
HDLC SERPL-MCNC: 68 MG/DL (ref 40–60)
HGB BLD-MCNC: 13.1 G/DL (ref 12–15.9)
IMM GRANULOCYTES # BLD AUTO: 0.04 10*3/MM3 (ref 0–0.05)
IMM GRANULOCYTES NFR BLD AUTO: 0.4 % (ref 0–0.5)
LDLC SERPL CALC-MCNC: 115 MG/DL (ref 0–100)
LDLC/HDLC SERPL: 1.68 {RATIO}
LYMPHOCYTES # BLD AUTO: 1.57 10*3/MM3 (ref 0.7–3.1)
LYMPHOCYTES NFR BLD AUTO: 17 % (ref 19.6–45.3)
MCH RBC QN AUTO: 32 PG (ref 26.6–33)
MCHC RBC AUTO-ENTMCNC: 34.7 G/DL (ref 31.5–35.7)
MCV RBC AUTO: 92.4 FL (ref 79–97)
MONOCYTES # BLD AUTO: 0.79 10*3/MM3 (ref 0.1–0.9)
MONOCYTES NFR BLD AUTO: 8.6 % (ref 5–12)
NEUTROPHILS NFR BLD AUTO: 6.67 10*3/MM3 (ref 1.7–7)
NEUTROPHILS NFR BLD AUTO: 72.2 % (ref 42.7–76)
NRBC BLD AUTO-RTO: 0 /100 WBC (ref 0–0.2)
PLATELET # BLD AUTO: 274 10*3/MM3 (ref 140–450)
PMV BLD AUTO: 10.3 FL (ref 6–12)
POTASSIUM SERPL-SCNC: 4.2 MMOL/L (ref 3.5–5.2)
PROT SERPL-MCNC: 7.1 G/DL (ref 6–8.5)
RBC # BLD AUTO: 4.09 10*6/MM3 (ref 3.77–5.28)
SODIUM SERPL-SCNC: 136 MMOL/L (ref 136–145)
TRIGL SERPL-MCNC: 63 MG/DL (ref 0–150)
VLDLC SERPL-MCNC: 12 MG/DL (ref 5–40)
WBC NRBC COR # BLD: 9.23 10*3/MM3 (ref 3.4–10.8)

## 2022-06-03 PROCEDURE — 85025 COMPLETE CBC W/AUTO DIFF WBC: CPT

## 2022-06-03 PROCEDURE — 80053 COMPREHEN METABOLIC PANEL: CPT

## 2022-06-03 PROCEDURE — 80061 LIPID PANEL: CPT

## 2022-06-03 PROCEDURE — 82306 VITAMIN D 25 HYDROXY: CPT

## 2022-06-06 ENCOUNTER — OFFICE VISIT (OUTPATIENT)
Dept: INTERNAL MEDICINE | Facility: CLINIC | Age: 87
End: 2022-06-06

## 2022-06-06 VITALS
SYSTOLIC BLOOD PRESSURE: 110 MMHG | TEMPERATURE: 98 F | WEIGHT: 111.4 LBS | BODY MASS INDEX: 22.46 KG/M2 | HEIGHT: 59 IN | DIASTOLIC BLOOD PRESSURE: 60 MMHG | HEART RATE: 80 BPM

## 2022-06-06 DIAGNOSIS — M81.0 AGE-RELATED OSTEOPOROSIS WITHOUT CURRENT PATHOLOGICAL FRACTURE: ICD-10-CM

## 2022-06-06 DIAGNOSIS — I10 PRIMARY HYPERTENSION: ICD-10-CM

## 2022-06-06 DIAGNOSIS — H61.23 BILATERAL IMPACTED CERUMEN: ICD-10-CM

## 2022-06-06 DIAGNOSIS — Z00.00 PREVENTATIVE HEALTH CARE: Primary | ICD-10-CM

## 2022-06-06 DIAGNOSIS — M41.20 IDIOPATHIC SCOLIOSIS AND KYPHOSCOLIOSIS: ICD-10-CM

## 2022-06-06 DIAGNOSIS — E78.2 MIXED HYPERLIPIDEMIA: ICD-10-CM

## 2022-06-06 PROCEDURE — 1159F MED LIST DOCD IN RCRD: CPT | Performed by: INTERNAL MEDICINE

## 2022-06-06 PROCEDURE — 1170F FXNL STATUS ASSESSED: CPT | Performed by: INTERNAL MEDICINE

## 2022-06-06 PROCEDURE — G0439 PPPS, SUBSEQ VISIT: HCPCS | Performed by: INTERNAL MEDICINE

## 2022-06-06 PROCEDURE — 1125F AMNT PAIN NOTED PAIN PRSNT: CPT | Performed by: INTERNAL MEDICINE

## 2022-06-06 PROCEDURE — 69209 REMOVE IMPACTED EAR WAX UNI: CPT | Performed by: INTERNAL MEDICINE

## 2022-06-06 RX ORDER — NAPROXEN SODIUM 220 MG
220 TABLET ORAL NIGHTLY PRN
COMMUNITY

## 2022-06-06 RX ORDER — FAMOTIDINE 20 MG/1
20 TABLET, FILM COATED ORAL DAILY
COMMUNITY
Start: 2017-05-23 | End: 2022-12-13

## 2022-06-06 NOTE — PROGRESS NOTES
QUICK REFERENCE INFORMATION:  The ABCs of the Annual Wellness Visit    Subsequent Medicare Wellness Visit    HEALTH RISK ASSESSMENT    1935    Recent Hospitalizations:  No hospitalization(s) within the last year..        Current Medical Providers:  Patient Care Team:  Juan Carlos Dunn MD as PCP - General (Internal Medicine)        Smoking Status:  Social History     Tobacco Use   Smoking Status Never Smoker   Smokeless Tobacco Never Used       Alcohol Consumption:  Social History     Substance and Sexual Activity   Alcohol Use No       Depression Screen:   PHQ-2/PHQ-9 Depression Screening 6/6/2022   Retired PHQ-9 Total Score -   Retired Total Score -   Little Interest or Pleasure in Doing Things 0-->not at all   Feeling Down, Depressed or Hopeless 0-->not at all   PHQ-9: Brief Depression Severity Measure Score 0       Health Habits and Functional and Cognitive Screening:  Functional & Cognitive Status 6/6/2022   Do you have difficulty preparing food and eating? No   Do you have difficulty bathing yourself, getting dressed or grooming yourself? Yes   Do you have difficulty using the toilet? No   Do you have difficulty moving around from place to place? Yes   Do you have trouble with steps or getting out of a bed or a chair? No   Current Diet Well Balanced Diet   Dental Exam Up to date   Eye Exam Up to date   Exercise (times per week) 0 times per week   Current Exercises Include No Regular Exercise   Current Exercise Activities Include -   Do you need help using the phone?  No   Are you deaf or do you have serious difficulty hearing?  Yes   Do you need help with transportation? Yes   Do you need help shopping? Yes   Do you need help preparing meals?  No   Do you need help with housework?  Yes   Do you need help with laundry? Yes   Do you need help taking your medications? No   Do you need help managing money? Yes   Do you ever drive or ride in a car without wearing a seat belt? No   Have you felt unusual  stress, anger or loneliness in the last month? No   Who do you live with? Community   If you need help, do you have trouble finding someone available to you? No   Have you been bothered in the last four weeks by sexual problems? No   Do you have difficulty concentrating, remembering or making decisions? Yes       Fall Risk Screen:  LEANN Fall Risk Assessment was completed, and patient is at MODERATE risk for falls. Assessment completed on:6/6/2022    ACE III MINI        Does the patient have evidence of cognitive impairment? No    Aspirin use counseling: Taking ASA appropriately as indicated    Recent Lab Results:  CMP:  Lab Results   Component Value Date    BUN 14 06/03/2022    CREATININE 0.81 06/03/2022    EGFRIFNONA 81 06/02/2021    BCR 17.3 06/03/2022     06/03/2022    K 4.2 06/03/2022    CO2 23.6 06/03/2022    CALCIUM 9.4 06/03/2022    ALBUMIN 4.70 06/03/2022    BILITOT 0.5 06/03/2022    ALKPHOS 57 06/03/2022    AST 16 06/03/2022    ALT 12 06/03/2022     HbA1c:  Lab Results   Component Value Date    HGBA1C 5.48 11/26/2019     Microalbumin:  Lab Results   Component Value Date    MICROALBUR 6.3 03/13/2019     Lipid Panel  Lab Results   Component Value Date    CHOL 195 06/03/2022    TRIG 63 06/03/2022    HDL 68 (H) 06/03/2022     (H) 06/03/2022    AST 16 06/03/2022    ALT 12 06/03/2022       Visual Acuity:  No exam data present    Age-appropriate Screening Schedule:  Refer to the list below for future screening recommendations based on patient's age, sex and/or medical conditions. Orders for these recommended tests are listed in the plan section. The patient has been provided with a written plan.    Health Maintenance   Topic Date Due   • TDAP/TD VACCINES (1 - Tdap) Never done   • ZOSTER VACCINE (2 of 3) 04/14/2009   • DXA SCAN  07/17/2021   • INFLUENZA VACCINE  08/01/2022   • LIPID PANEL  06/03/2023        Subjective   History of Present Illness    Megan Garcia is a 87 y.o. female who presents  for a Subsequent Wellness Visit.    and for follow-up of hypertension, hyperlipidemia, scoliosis, and osteoporosis. She is accompanied by an adult female.    The patient states she is feeling good most of the time. She admits she is not exercising like she used to do, due to being sick.  She reports she is going to resume exercising due to her knees not letting her get up and down. She states she used to go to her exercise  all the time.     She states she got rid of the problem that she's had for years. She reports she did a cleanse and it didn't come back. She is taking Metamucil every day. She admits she is supposed to go to every other day, but that is a little much for her. She reports she is going everything 3rd or 4th day.     She states she has been able to walk more with a rollator. The adult reports she doesn't use the  wheelchair as much when she travels. She patient states she has her scooter and travels well with it, and can operate it without difficulty. The adult states that the patient just recently got a lift chair. She states, one day, while eating, she felt as though she couldn't get her breath. The adult reports her breathing is a little bit harder when she is under stress. She states she makes her own bed, going around it at least twice for exercise.     The adult reports that the patient went to the hearing aid center the Friday after she did her labs, and the doctor reported that her ears were 95 percent blocked.     CHRONIC CONDITIONS    The following portions of the patient's history were reviewed and updated as appropriate: allergies, current medications, past family history, past medical history, past social history, past surgical history and problem list.    Outpatient Medications Prior to Visit   Medication Sig Dispense Refill   • acetaminophen (TYLENOL 8 HOUR ARTHRITIS PAIN) 650 MG 8 hr tablet Take 1 tablet by mouth Daily.     • amLODIPine (NORVASC) 10 MG tablet TAKE ONE TABLET  BY MOUTH DAILY 90 tablet 2   • CALCIUM PO Take 1 tablet by mouth Daily. 2 gummies  Tries to take it daily     • clopidogrel (PLAVIX) 75 MG tablet TAKE 1 TABLET BY MOUTH  DAILY 90 tablet 3   • famotidine (PEPCID) 20 MG tablet Take 20 mg by mouth Daily.     • fexofenadine (ALLEGRA) 180 MG tablet Take 180 mg by mouth Daily.     • Multiple Vitamins-Minerals (MULTIVITAMIN ADULT PO) Take 1 tablet by mouth Daily.     • naproxen sodium (ALEVE) 220 MG tablet Take 220 mg by mouth At Night As Needed.     • olmesartan (BENICAR) 40 MG tablet TAKE 1 TABLET BY MOUTH  DAILY 60 tablet 5   • polyethylene glycol (MIRALAX) 17 GM/SCOOP powder Take 17 g by mouth Every Other Day.     • vitamin C (ASCORBIC ACID) 500 MG tablet Take 1 tablet by mouth Daily.     • ASPIRIN 81 PO Take 81 mg by mouth Daily.     • atorvastatin (LIPITOR) 20 MG tablet Take 1 tablet by mouth Daily. 90 tablet 3   • diclofenac (VOLTAREN) 1 % gel gel Apply 4 g topically to the appropriate area as directed 4 (Four) Times a Day As Needed (pain). 60 g 1     No facility-administered medications prior to visit.       Patient Active Problem List   Diagnosis   • Allergic rhinitis   • At risk for falls   • Ataxic gait   • Primary hypertension   • Idiopathic scoliosis and kyphoscoliosis   • Irritable bowel syndrome with constipation   • Mixed hyperlipidemia   • Non-allergic rhinitis   • Osteoarthritis   • Osteoporosis   • Urinary incontinence   • Primary osteoarthritis of both knees   • Primary osteoarthritis of right hip       Advance Care Planning:  ACP discussion was held with the patient during this visit. Patient has an advance directive in EMR which is still valid.     Identification of Risk Factors:  Risk factors include: Advance Directive Discussion.    Review of Systems   Constitutional: Negative for chills, fatigue and fever.   HENT: Negative for congestion, ear pain and sinus pressure.    Respiratory: Negative for cough, chest tightness, shortness of breath and  "wheezing.    Cardiovascular: Negative for chest pain and palpitations.   Gastrointestinal: Negative for abdominal pain, blood in stool and constipation.   Skin: Negative for color change.   Allergic/Immunologic: Negative for environmental allergies.   Neurological: Negative for dizziness, speech difficulty and headaches.   Psychiatric/Behavioral: Negative for confusion. The patient is not nervous/anxious.        Compared to one year ago, the patient feels her physical health is the same.  Compared to one year ago, the patient feels her mental health is the same.    Objective     Physical Exam  Vitals reviewed.   Constitutional:       Appearance: She is well-developed.   HENT:      Head: Normocephalic and atraumatic.      Ears:      Comments: External auditory canals bilaterally are occluded with cerumen.  Cardiovascular:      Rate and Rhythm: Normal rate and regular rhythm.      Heart sounds: Normal heart sounds. No murmur heard.     Comments: Trace pedal edema.  Pulmonary:      Effort: Pulmonary effort is normal.      Breath sounds: Normal breath sounds.   Neurological:      Mental Status: She is alert and oriented to person, place, and time.          Procedures     Vitals:    06/06/22 1352   BP: 110/60   BP Location: Left arm   Patient Position: Sitting   Cuff Size: Adult   Pulse: 80   Temp: 98 °F (36.7 °C)   Weight: 50.5 kg (111 lb 6.4 oz)   Height: 149.2 cm (58.75\")   PainSc:   2   PainLoc: Back  Comment: shoulders       BMI has not been calculated during today's encounter.       Assessment & Plan   Problem List Items Addressed This Visit        Cardiac and Vasculature    Primary hypertension    Relevant Medications    amLODIPine (NORVASC) 10 MG tablet    olmesartan (BENICAR) 40 MG tablet    Mixed hyperlipidemia    Relevant Medications    atorvastatin (LIPITOR) 20 MG tablet       Musculoskeletal and Injuries    Idiopathic scoliosis and kyphoscoliosis    Osteoporosis      Other Visit Diagnoses     Preventative " health care    -  Primary    Bilateral impacted cerumen        Relevant Orders    Ambulatory Referral to ENT (Otolaryngology) (Completed)        1. Prevention  - Overall, she is doing remarkably well at age 87. She had been plagued with frequent loose stools for months and had actually lost strength and mobility. I am not sure why it is that the colonoscopy prep helped; however, she is clearly better, much more mobile, and getting ready to go back to exercise classes. She is fully vaccinated against COVID-19.    2. Hypertension  - Blood pressure is well controlled on amlodipine and olmesartan.    3. Scoliosis  - She uses a scooter at the nursing facility and a rollator here in the office, so she is fairly steady without discomfort.    4. Osteoporosis  - Vitamin D level is normal. She will continue calcium and vitamin D.    Follow up in 6 months.      Patient Self-Management and Personalized Health Advice  The patient has been provided with information about: diet and fall prevention and preventive services including:   · Annual Wellness Visit (AWV).    Outpatient Encounter Medications as of 6/6/2022   Medication Sig Dispense Refill   • acetaminophen (TYLENOL 8 HOUR ARTHRITIS PAIN) 650 MG 8 hr tablet Take 1 tablet by mouth Daily.     • amLODIPine (NORVASC) 10 MG tablet TAKE ONE TABLET BY MOUTH DAILY 90 tablet 2   • CALCIUM PO Take 1 tablet by mouth Daily. 2 gummies  Tries to take it daily     • clopidogrel (PLAVIX) 75 MG tablet TAKE 1 TABLET BY MOUTH  DAILY 90 tablet 3   • famotidine (PEPCID) 20 MG tablet Take 20 mg by mouth Daily.     • fexofenadine (ALLEGRA) 180 MG tablet Take 180 mg by mouth Daily.     • Multiple Vitamins-Minerals (MULTIVITAMIN ADULT PO) Take 1 tablet by mouth Daily.     • naproxen sodium (ALEVE) 220 MG tablet Take 220 mg by mouth At Night As Needed.     • olmesartan (BENICAR) 40 MG tablet TAKE 1 TABLET BY MOUTH  DAILY 60 tablet 5   • polyethylene glycol (MIRALAX) 17 GM/SCOOP powder Take 17 g by  mouth Every Other Day.     • vitamin C (ASCORBIC ACID) 500 MG tablet Take 1 tablet by mouth Daily.     • ASPIRIN 81 PO Take 81 mg by mouth Daily.     • atorvastatin (LIPITOR) 20 MG tablet Take 1 tablet by mouth Daily. 90 tablet 3   • [DISCONTINUED] diclofenac (VOLTAREN) 1 % gel gel Apply 4 g topically to the appropriate area as directed 4 (Four) Times a Day As Needed (pain). 60 g 1     No facility-administered encounter medications on file as of 6/6/2022.       Reviewed use of high risk medication in the elderly: yes  Reviewed for potential of harmful drug interactions in the elderly: yes    Follow Up:  Return in about 6 months (around 12/6/2022) for follow up.     There are no Patient Instructions on file for this visit.    An After Visit Summary and PPPS with all of these plans were given to the patient.              Transcribed from ambient dictation for Juan Carlos Dunn MD by Geneva Dutton.  06/06/22   16:11 EDT    Patient verbalized consent to the visit recording.

## 2022-06-06 NOTE — PROGRESS NOTES
Ear Cerumen Removal    Date/Time: 6/6/2022 2:56 PM  Performed by: Juan Carlos Dunn MD  Authorized by: Juan Carlos Dunn MD   Location details: left ear and right ear  Patient tolerance: patient tolerated the procedure well with no immediate complications  Comments: Unaable to remove all wax - refer to ENT  Procedure type: irrigation   Sedation:  Patient sedated: no

## 2022-07-15 RX ORDER — OLMESARTAN MEDOXOMIL 40 MG/1
TABLET ORAL
Qty: 7 TABLET | OUTPATIENT
Start: 2022-07-15

## 2022-09-02 RX ORDER — CLOPIDOGREL BISULFATE 75 MG/1
75 TABLET ORAL DAILY
Qty: 90 TABLET | Refills: 3 | Status: SHIPPED | OUTPATIENT
Start: 2022-09-02 | End: 2022-09-22 | Stop reason: SDUPTHER

## 2022-09-06 RX ORDER — OLMESARTAN MEDOXOMIL 40 MG/1
TABLET ORAL
Qty: 90 TABLET | Refills: 1 | Status: SHIPPED | OUTPATIENT
Start: 2022-09-06 | End: 2022-09-07 | Stop reason: SDUPTHER

## 2022-09-06 NOTE — TELEPHONE ENCOUNTER
Caller: DONTAE FRAZIERN    Relationship: Emergency Contact    Best call back number: [phone]    Requested Prescriptions:   Requested Prescriptions     Pending Prescriptions Disp Refills   • olmesartan (BENICAR) 40 MG tablet [Pharmacy Med Name: OLMESARTAN MEDOXOMIL 40 MG TAB] 53 tablet      Sig: TAKE ONE TABLET BY MOUTH DAILY        Pharmacy where request should be sent: [facility] [address]  AT [facility] & [address] [place] [address] - [phone]  - [phone] FX   Does the patient have less than a 3 day supply:  [] Yes  [x] No    Gaurav Barr   09/06/22 15:38 EDT

## 2022-09-07 RX ORDER — OLMESARTAN MEDOXOMIL 40 MG/1
40 TABLET ORAL DAILY
Qty: 90 TABLET | Refills: 1 | Status: SHIPPED | OUTPATIENT
Start: 2022-09-07

## 2022-09-07 NOTE — TELEPHONE ENCOUNTER
Caller: Megan Garcia    Relationship: Self    Best call back number: [phone]     Requested Prescriptions:   Requested Prescriptions     Pending Prescriptions Disp Refills   • olmesartan (BENICAR) 40 MG tablet 90 tablet 1     Sig: Take 1 tablet by mouth Daily.        Pharmacy where request should be sent: [facility] [address]  AT [facility] & [address] [place] [address] - [phone]  - [phone] FX     Additional details provided by patient: PATIENT HAS 3 DAYS REMAINING AND NEEDS A NEW PRESCRIPTION SENT IN BY 09/09/2022.    Does the patient have less than a 3 day supply:  [x] Yes  [] No    Gaurav Barker Rep   09/07/22 09:41 EDT

## 2022-09-20 RX ORDER — CLOPIDOGREL BISULFATE 75 MG/1
75 TABLET ORAL DAILY
Qty: 90 TABLET | Refills: 3 | Status: CANCELLED | OUTPATIENT
Start: 2022-09-20

## 2022-09-20 NOTE — TELEPHONE ENCOUNTER
Caller: Megan Garcia HEATHER    Relationship: Self    Best call back number:     [phone]    Requested Prescriptions:   Requested Prescriptions     Pending Prescriptions Disp Refills   • clopidogrel (PLAVIX) 75 MG tablet 90 tablet 3     Sig: Take 1 tablet by mouth Daily.        PATIENT REQUESTED A (90) DAY SUPPLY    Pharmacy where request should be sent: [facility] [address]  AT [facility] & [address] [place] [address] - [phone]  - [phone] FX     Additional details provided by patient:     PATIENT HAS AT LEAST A (3) DAY SUPPLY    Does the patient have less than a 3 day supply:  [] Yes  [x] No    Gaurav Owusu Rep   09/20/22 10:09 EDT     DR GILBERT

## 2022-09-22 RX ORDER — CLOPIDOGREL BISULFATE 75 MG/1
75 TABLET ORAL DAILY
Qty: 90 TABLET | Refills: 3 | Status: SHIPPED | OUTPATIENT
Start: 2022-09-22

## 2022-09-22 NOTE — TELEPHONE ENCOUNTER
Rx Refill Note  Requested Prescriptions     Pending Prescriptions Disp Refills   • clopidogrel (PLAVIX) 75 MG tablet 90 tablet 3     Sig: Take 1 tablet by mouth Daily.      Last office visit with prescribing clinician: 6/6/2022      Next office visit with prescribing clinician: 12/13/2022            Radha Tsai RN  09/22/22, 16:41 EDT

## 2022-11-09 RX ORDER — AMLODIPINE BESYLATE 10 MG/1
TABLET ORAL
Qty: 90 TABLET | Refills: 2 | Status: SHIPPED | OUTPATIENT
Start: 2022-11-09

## 2022-11-14 ENCOUNTER — TELEPHONE (OUTPATIENT)
Dept: INTERNAL MEDICINE | Facility: CLINIC | Age: 87
End: 2022-11-14

## 2022-11-14 DIAGNOSIS — N39.46 MIXED STRESS AND URGE URINARY INCONTINENCE: Primary | ICD-10-CM

## 2022-11-14 NOTE — TELEPHONE ENCOUNTER
Johann from [facility] called with the patient stating that she was requesting home health and would like to get into the incontinence control program again.    Johann stated the program is done through their occupational therapist.    He is requesting Home health, Incontinence program, PT, and OT orders     [phone]  [phone]

## 2022-11-14 NOTE — TELEPHONE ENCOUNTER
Martina with [facility] called stating she got the orders that were faxed for the patient but she also needed the last office note and a demographics sheet.    Last note and demographics faxed to [phone]

## 2022-11-21 ENCOUNTER — TELEPHONE (OUTPATIENT)
Dept: INTERNAL MEDICINE | Facility: CLINIC | Age: 87
End: 2022-11-21

## 2022-12-08 ENCOUNTER — OUTSIDE FACILITY SERVICE (OUTPATIENT)
Dept: INTERNAL MEDICINE | Facility: CLINIC | Age: 87
End: 2022-12-08

## 2022-12-08 PROCEDURE — G0180 MD CERTIFICATION HHA PATIENT: HCPCS | Performed by: INTERNAL MEDICINE

## 2022-12-13 ENCOUNTER — OFFICE VISIT (OUTPATIENT)
Dept: INTERNAL MEDICINE | Facility: CLINIC | Age: 87
End: 2022-12-13

## 2022-12-13 VITALS
BODY MASS INDEX: 22.42 KG/M2 | DIASTOLIC BLOOD PRESSURE: 60 MMHG | HEIGHT: 59 IN | TEMPERATURE: 97.7 F | HEART RATE: 80 BPM | WEIGHT: 111.2 LBS | SYSTOLIC BLOOD PRESSURE: 118 MMHG

## 2022-12-13 DIAGNOSIS — E78.2 MIXED HYPERLIPIDEMIA: ICD-10-CM

## 2022-12-13 DIAGNOSIS — M41.20 IDIOPATHIC SCOLIOSIS AND KYPHOSCOLIOSIS: ICD-10-CM

## 2022-12-13 DIAGNOSIS — I10 PRIMARY HYPERTENSION: Primary | ICD-10-CM

## 2022-12-13 DIAGNOSIS — M16.12 PRIMARY OSTEOARTHRITIS OF LEFT HIP: ICD-10-CM

## 2022-12-13 PROCEDURE — 99214 OFFICE O/P EST MOD 30 MIN: CPT | Performed by: INTERNAL MEDICINE

## 2022-12-13 NOTE — PROGRESS NOTES
[facility]     Megan Garcia  1935   [id number]      Patient Care Team:  Juan Carlos Dunn MD as PCP - General (Internal Medicine)    Chief Complaint::   Chief Complaint   Patient presents with   • Hypertension   • Hyperlipidemia        HPI  The patient presents for follow-up of hypertension, hyperlipidemia, kyphoscoliosis, and osteoarthritis. She is accompanied by an adult male.    Shoulder pain  The patient states she is doing well physically. She states she has occasional shoulder pain from sleeping on it. She states she sits in her chair and is constantly reaching over the chair to get things down. She states she is leaning over the chair.    Bowel issues  The patient states she was having bowel issues again. She states the therapy has helped. She states she was having issues that she wanted to relearn.    Hypertension  She states she is good on all of her prescriptions.    Health maintenance  The patient states she has had all of her COVID-19 vaccines.     Dysphagia  The adult female states the patient went to the emergency room recently because she had beef stuck in her throat from eating too fast. She notes they gave her a muscle relaxer and a coke and helped her swallow it. She states she takes Tums after every meal.     Chronic Conditions:  Hypertension, hyperlipidemia, kyphoscoliosis, and osteoarthritis.    Patient Active Problem List   Diagnosis   • Allergic rhinitis   • At risk for falls   • Ataxic gait   • Primary hypertension   • Idiopathic scoliosis and kyphoscoliosis   • Irritable bowel syndrome with constipation   • Mixed hyperlipidemia   • Non-allergic rhinitis   • Osteoarthritis   • Osteoporosis   • Urinary incontinence   • Primary osteoarthritis of both knees   • Primary osteoarthritis of right hip        Past Medical History:   Diagnosis Date   • Allergic    • Arthritis    • Hyperlipidemia    • Hypertension    • Intervertebral disc displacement    • Left periventricular  lacunar stroke 10/19/2015       Past Surgical History:   Procedure Laterality Date   • LAMINECTOMY  1974   • TOTAL KNEE ARTHROPLASTY Left 2004       Family History   Problem Relation Age of Onset   • Coronary artery disease Father        Social History     Socioeconomic History   • Marital status:    Tobacco Use   • Smoking status: Never   • Smokeless tobacco: Never   Substance and Sexual Activity   • Alcohol use: No   • Drug use: No   • Sexual activity: Defer       No Known Allergies      Current Outpatient Medications:   •  acetaminophen (TYLENOL 8 HOUR ARTHRITIS PAIN) 650 MG 8 hr tablet, Take 1 tablet by mouth Daily., Disp: , Rfl:   •  amLODIPine (NORVASC) 10 MG tablet, TAKE ONE TABLET BY MOUTH DAILY, Disp: 90 tablet, Rfl: 2  •  ASPIRIN 81 PO, Take 81 mg by mouth Daily., Disp: , Rfl:   •  Calcium Carbonate Antacid (TUMS PO), Take 1 tablet by mouth 3 (Three) Times a Day With Meals., Disp: , Rfl:   •  CALCIUM PO, Take 1 tablet by mouth Daily. 2 gummies  Tries to take it daily, Disp: , Rfl:   •  clopidogrel (PLAVIX) 75 MG tablet, Take 1 tablet by mouth Daily., Disp: 90 tablet, Rfl: 3  •  fexofenadine (ALLEGRA) 180 MG tablet, Take 180 mg by mouth Daily., Disp: , Rfl:   •  Multiple Vitamins-Minerals (MULTIVITAMIN ADULT PO), Take 1 tablet by mouth Daily., Disp: , Rfl:   •  naproxen sodium (ALEVE) 220 MG tablet, Take 220 mg by mouth At Night As Needed., Disp: , Rfl:   •  olmesartan (BENICAR) 40 MG tablet, Take 1 tablet by mouth Daily., Disp: 90 tablet, Rfl: 1  •  polyethylene glycol (MIRALAX) 17 GM/SCOOP powder, Take 17 g by mouth Every Other Day., Disp: , Rfl:   •  atorvastatin (LIPITOR) 20 MG tablet, Take 1 tablet by mouth Daily., Disp: 90 tablet, Rfl: 3    Review of Systems   A review of systems was performed, and positive findings are noted in the HPI.      Vital Signs  Vitals:    12/13/22 1129   BP: 118/60   BP Location: Left arm   Patient Position: Sitting   Cuff Size: Adult   Pulse: 80   Temp: 97.7 °F  "(36.5 °C)   Weight: 50.4 kg (111 lb 3.2 oz)   Height: 149.2 cm (58.74\")   PainSc:   2   PainLoc: Shoulder  Comment: right       Physical Exam  Vitals reviewed.   Constitutional:       Appearance: She is well-developed.   HENT:      Head: Normocephalic and atraumatic.   Cardiovascular:      Rate and Rhythm: Normal rate and regular rhythm.      Heart sounds: Normal heart sounds. No murmur heard.  Pulmonary:      Effort: Pulmonary effort is normal.      Breath sounds: Normal breath sounds.   Neurological:      Mental Status: She is alert and oriented to person, place, and time.        Physical examination: no obvious kyphoscoliosis. She has 1+ edema at the left ankle, no edema on the right.    Procedures    ACE III MINI             Assessment/Plan:      1. Hypertension  - Blood pressure is well controlled on amlodipine and olmesartan.    2. Hyperlipidemia  - Lipids are well controlled on atorvastatin.  - Plan labs next visit.    3. Idiopathic kyphoscoliosis  - Currently asymptomatic.    4. History of stroke  - Asymptomatic on aspirin, atorvastatin, clopidogrel, and blood pressure control.    Follow up in 6 months.    Diagnoses and all orders for this visit:    1. Primary hypertension (Primary)    2. Mixed hyperlipidemia    3. Idiopathic scoliosis and kyphoscoliosis    4. Primary osteoarthritis of left hip          Plan of care reviewed with patient at the conclusion of today's visit. Education was provided regarding diagnosis, management, and any prescribed or recommended OTC medications.Patient verbalizes understanding of and agreement with management plan.         Juan Carlos Dunn MD       Transcribed from ambient dictation for Juan Carlos Dunn MD by Mari Burr.  12/13/22   13:03 EST    Patient or patient representative verbalized consent to the visit recording.  I have personally performed the services described in this document as transcribed by the above individual, and it is both accurate and " complete.

## 2022-12-21 ENCOUNTER — OUTSIDE FACILITY SERVICE (OUTPATIENT)
Dept: INTERNAL MEDICINE | Facility: CLINIC | Age: 87
End: 2022-12-21

## 2022-12-21 PROCEDURE — OUTSIDEPOS PR OUTSIDE POS PLACEHOLDER: Performed by: INTERNAL MEDICINE

## 2023-03-06 ENCOUNTER — OFFICE VISIT (OUTPATIENT)
Dept: INTERNAL MEDICINE | Facility: CLINIC | Age: 88
End: 2023-03-06
Payer: MEDICARE

## 2023-03-06 VITALS
TEMPERATURE: 98 F | SYSTOLIC BLOOD PRESSURE: 112 MMHG | HEIGHT: 59 IN | BODY MASS INDEX: 21.89 KG/M2 | HEART RATE: 80 BPM | DIASTOLIC BLOOD PRESSURE: 62 MMHG | WEIGHT: 108.6 LBS

## 2023-03-06 DIAGNOSIS — S50.812D ABRASION OF LEFT FOREARM, SUBSEQUENT ENCOUNTER: Primary | ICD-10-CM

## 2023-03-06 DIAGNOSIS — G89.29 CHRONIC LEFT SHOULDER PAIN: ICD-10-CM

## 2023-03-06 DIAGNOSIS — M25.512 CHRONIC LEFT SHOULDER PAIN: ICD-10-CM

## 2023-03-06 PROCEDURE — 99213 OFFICE O/P EST LOW 20 MIN: CPT | Performed by: INTERNAL MEDICINE

## 2023-03-06 NOTE — PROGRESS NOTES
[facility]     Megan Garcia  1935   [id number]      Patient Care Team:  Juan Carlos Dunn MD as PCP - General (Internal Medicine)    Chief Complaint::   Chief Complaint   Patient presents with   • Arm Injury     Left arm hit by a door about 2 weeks ago        HPI    The patient comes in complaining of an abrasion on the left arm.    Abrasion on the left arm  The patient states that she was in the wrong place for a long time. She states that the door was opened a little too quickly in the dining room. She states that the paramedics came and they told her that she does not need stitches. She states that they were going to bandage it. She states that it was 2 weeks ago. She states that her sister-in-law changed the bandage and put a waterproof bandages, but it started to irritate her arm. She states that she took it off on Saturday, 03/04/2023 and put a gauze went on and it did not stay in place and slipped right down. She states that it is draining some at night. She states that it looks pretty dried up, but sometimes at night it does. She states that it gets blood on her sheets and then she has to mess with them. She states that no one else washing. She states that she does not wear a sharp sleeve as much.    Chronic Conditions:      Patient Active Problem List   Diagnosis   • Allergic rhinitis   • At risk for falls   • Ataxic gait   • Primary hypertension   • Idiopathic scoliosis and kyphoscoliosis   • Irritable bowel syndrome with constipation   • Mixed hyperlipidemia   • Non-allergic rhinitis   • Osteoarthritis   • Osteoporosis   • Urinary incontinence   • Primary osteoarthritis of both knees   • Primary osteoarthritis of right hip        Past Medical History:   Diagnosis Date   • Allergic    • Arthritis    • Hyperlipidemia    • Hypertension    • Intervertebral disc displacement    • Left periventricular lacunar stroke 10/19/2015       Past Surgical History:   Procedure Laterality Date    • LAMINECTOMY  1974   • TOTAL KNEE ARTHROPLASTY Left 2004       Family History   Problem Relation Age of Onset   • Coronary artery disease Father        Social History     Socioeconomic History   • Marital status:    Tobacco Use   • Smoking status: Never   • Smokeless tobacco: Never   Substance and Sexual Activity   • Alcohol use: No   • Drug use: No   • Sexual activity: Defer       No Known Allergies      Current Outpatient Medications:   •  acetaminophen (TYLENOL 8 HOUR ARTHRITIS PAIN) 650 MG 8 hr tablet, Take 1 tablet by mouth Daily., Disp: , Rfl:   •  amLODIPine (NORVASC) 10 MG tablet, TAKE ONE TABLET BY MOUTH DAILY, Disp: 90 tablet, Rfl: 2  •  ASPIRIN 81 PO, Take 81 mg by mouth Daily., Disp: , Rfl:   •  Calcium Carbonate Antacid (TUMS PO), Take 1 tablet by mouth 3 (Three) Times a Day With Meals., Disp: , Rfl:   •  CALCIUM PO, Take 1 tablet by mouth Daily. 2 gummies  Tries to take it daily, Disp: , Rfl:   •  clopidogrel (PLAVIX) 75 MG tablet, Take 1 tablet by mouth Daily., Disp: 90 tablet, Rfl: 3  •  fexofenadine (ALLEGRA) 180 MG tablet, Take 1 tablet by mouth Daily., Disp: , Rfl:   •  Multiple Vitamins-Minerals (MULTIVITAMIN ADULT PO), Take 1 tablet by mouth Daily., Disp: , Rfl:   •  naproxen sodium (ALEVE) 220 MG tablet, Take 1 tablet by mouth At Night As Needed., Disp: , Rfl:   •  olmesartan (BENICAR) 40 MG tablet, Take 1 tablet by mouth Daily., Disp: 90 tablet, Rfl: 1  •  polyethylene glycol (MIRALAX) 17 GM/SCOOP powder, Take 17 g by mouth Every Other Day., Disp: , Rfl:     Review of Systems   Review of systems is remarkable for decreased range of motion of the left shoulder where she requires help of the right arm to lift her left arm over her head. However, she states it is the right shoulder that hurts.    Vital Signs  Vitals:    03/06/23 1501   BP: 112/62   BP Location: Right arm   Patient Position: Sitting   Cuff Size: Adult   Pulse: 80   Temp: 98 °F (36.7 °C)   Weight: 49.3 kg (108 lb 9.6 oz)  "  Height: 149.2 cm (58.74\")   PainSc:   5   PainLoc: Shoulder  Comment: right       Physical Exam   She has decreased range of motion of both shoulders. She is actually able to raise the left upper extremity over her head, but at the level of her shoulder, it becomes difficult and she uses the right arm. On the left forearm, there is a 1 x 3 cm abrasion that is scabbed over the margins are non-erythematous. There is no drainage. There is no evidence of infection.    Procedures    ACE III MINI             Assessment/Plan:    1. Abrasion, left forearm  - This is healing well. She just needs to cover it for protection and so it does not bleed on bed sheets. I have told her she can shower and get that area wet.    2. Decreased range of motion of the left shoulder  - Most likely rotator cuff injury. The treatment should she choose would be physical therapy. If we order physical therapy, we will order it for both shoulders.    Follow up as previously scheduled.      Diagnoses and all orders for this visit:    1. Abrasion of left forearm, subsequent encounter (Primary)    2. Chronic left shoulder pain          Plan of care reviewed with patient at the conclusion of today's visit. Education was provided regarding diagnosis, management, and any prescribed or recommended OTC medications.Patient verbalizes understanding of and agreement with management plan.         Juan Carlos Dunn MD     Transcribed from ambient dictation for Juan Carlos Dunn MD by Mari Burr.  03/06/23   17:04 EST    Patient or patient representative verbalized consent to the visit recording.  I have personally performed the services described in this document as transcribed by the above individual, and it is both accurate and complete.      "

## 2023-03-07 ENCOUNTER — TELEPHONE (OUTPATIENT)
Dept: INTERNAL MEDICINE | Facility: CLINIC | Age: 88
End: 2023-03-07
Payer: MEDICARE

## 2023-03-07 DIAGNOSIS — G89.29 CHRONIC PAIN OF LEFT UPPER EXTREMITY: Primary | ICD-10-CM

## 2023-03-07 DIAGNOSIS — M79.602 CHRONIC PAIN OF LEFT UPPER EXTREMITY: Primary | ICD-10-CM

## 2023-03-07 NOTE — TELEPHONE ENCOUNTER
GINA FROM [employer] CALLED AND STATED HE NEEDS A REFERRAL FAXED OVER FOR THE PT FOR HOME HEALTH FOR PT AND OT.  LUE    PLEASE FAX -[phone].    ALONG WITH THE ORDER THEY WILL NEED THE VISIT NOTE AND FACESHEET FROM YESTERDAY.    PLEASE CALL GINA BACK IF YOU HAVE ANY QUESTIONS.  [phone].

## 2023-03-16 ENCOUNTER — OUTSIDE FACILITY SERVICE (OUTPATIENT)
Dept: INTERNAL MEDICINE | Facility: CLINIC | Age: 88
End: 2023-03-16
Payer: MEDICARE

## 2023-05-11 ENCOUNTER — TELEPHONE (OUTPATIENT)
Dept: INTERNAL MEDICINE | Facility: CLINIC | Age: 88
End: 2023-05-11
Payer: MEDICARE

## 2023-05-11 NOTE — TELEPHONE ENCOUNTER
"FYI call    Patient's daughter Jany called requesting information on the patient's home health physical therapy    She states the patient was telling her that she was not actually getting any service from the physical therapist, that he was just poking his head in and leaving. Patient wanted to make sure \"he was not just collecting money\" since she had requested DC from PT    Jany said it is extremely hard to get a clear answer from the patient when asking clarifying questions and mentioned that there may be a difference in what the patient believes constitutes a \"visit\" and what a visit is    I gave her the contact information for [facility] to follow up on this, I also let her know that I did have a discharge summary from [facility] on file DC date 4/10    She does not need anything done but wanted Dr Dunn to be aware  "

## 2023-06-14 ENCOUNTER — LAB (OUTPATIENT)
Dept: LAB | Facility: HOSPITAL | Age: 88
End: 2023-06-14
Payer: MEDICARE

## 2023-06-14 ENCOUNTER — OFFICE VISIT (OUTPATIENT)
Dept: INTERNAL MEDICINE | Facility: CLINIC | Age: 88
End: 2023-06-14
Payer: MEDICARE

## 2023-06-14 VITALS
HEIGHT: 59 IN | TEMPERATURE: 98.7 F | BODY MASS INDEX: 21.81 KG/M2 | WEIGHT: 108.2 LBS | DIASTOLIC BLOOD PRESSURE: 62 MMHG | SYSTOLIC BLOOD PRESSURE: 108 MMHG | HEART RATE: 84 BPM

## 2023-06-14 DIAGNOSIS — Z00.00 PREVENTATIVE HEALTH CARE: Primary | ICD-10-CM

## 2023-06-14 DIAGNOSIS — E78.2 MIXED HYPERLIPIDEMIA: ICD-10-CM

## 2023-06-14 DIAGNOSIS — R32 URINARY INCONTINENCE, UNSPECIFIED TYPE: ICD-10-CM

## 2023-06-14 DIAGNOSIS — I10 PRIMARY HYPERTENSION: ICD-10-CM

## 2023-06-14 DIAGNOSIS — M41.20 IDIOPATHIC SCOLIOSIS AND KYPHOSCOLIOSIS: ICD-10-CM

## 2023-06-14 DIAGNOSIS — M81.0 AGE-RELATED OSTEOPOROSIS WITHOUT CURRENT PATHOLOGICAL FRACTURE: ICD-10-CM

## 2023-06-14 DIAGNOSIS — K58.1 IRRITABLE BOWEL SYNDROME WITH CONSTIPATION: ICD-10-CM

## 2023-06-14 LAB
25(OH)D3 SERPL-MCNC: 41 NG/ML (ref 30–100)
ALBUMIN SERPL-MCNC: 4.5 G/DL (ref 3.5–5.2)
ALBUMIN/GLOB SERPL: 1.8 G/DL
ALP SERPL-CCNC: 56 U/L (ref 39–117)
ALT SERPL W P-5'-P-CCNC: 12 U/L (ref 1–33)
ANION GAP SERPL CALCULATED.3IONS-SCNC: 11.6 MMOL/L (ref 5–15)
AST SERPL-CCNC: 19 U/L (ref 1–32)
BASOPHILS # BLD AUTO: 0.06 10*3/MM3 (ref 0–0.2)
BASOPHILS NFR BLD AUTO: 0.7 % (ref 0–1.5)
BILIRUB SERPL-MCNC: 0.4 MG/DL (ref 0–1.2)
BUN SERPL-MCNC: 18 MG/DL (ref 8–23)
BUN/CREAT SERPL: 21.2 (ref 7–25)
CALCIUM SPEC-SCNC: 9.7 MG/DL (ref 8.6–10.5)
CHLORIDE SERPL-SCNC: 103 MMOL/L (ref 98–107)
CHOLEST SERPL-MCNC: 184 MG/DL (ref 0–200)
CO2 SERPL-SCNC: 22.4 MMOL/L (ref 22–29)
CREAT SERPL-MCNC: 0.85 MG/DL (ref 0.57–1)
DEPRECATED RDW RBC AUTO: 41.3 FL (ref 37–54)
EGFRCR SERPLBLD CKD-EPI 2021: 66 ML/MIN/1.73
EOSINOPHIL # BLD AUTO: 0.1 10*3/MM3 (ref 0–0.4)
EOSINOPHIL NFR BLD AUTO: 1.2 % (ref 0.3–6.2)
ERYTHROCYTE [DISTWIDTH] IN BLOOD BY AUTOMATED COUNT: 12.6 % (ref 12.3–15.4)
GLOBULIN UR ELPH-MCNC: 2.5 GM/DL
GLUCOSE SERPL-MCNC: 95 MG/DL (ref 65–99)
HCT VFR BLD AUTO: 37.4 % (ref 34–46.6)
HDLC SERPL-MCNC: 59 MG/DL (ref 40–60)
HGB BLD-MCNC: 13 G/DL (ref 12–15.9)
IMM GRANULOCYTES # BLD AUTO: 0.03 10*3/MM3 (ref 0–0.05)
IMM GRANULOCYTES NFR BLD AUTO: 0.3 % (ref 0–0.5)
LDLC SERPL CALC-MCNC: 111 MG/DL (ref 0–100)
LDLC/HDLC SERPL: 1.86 {RATIO}
LYMPHOCYTES # BLD AUTO: 1.44 10*3/MM3 (ref 0.7–3.1)
LYMPHOCYTES NFR BLD AUTO: 16.7 % (ref 19.6–45.3)
MCH RBC QN AUTO: 31.6 PG (ref 26.6–33)
MCHC RBC AUTO-ENTMCNC: 34.8 G/DL (ref 31.5–35.7)
MCV RBC AUTO: 91 FL (ref 79–97)
MONOCYTES # BLD AUTO: 0.8 10*3/MM3 (ref 0.1–0.9)
MONOCYTES NFR BLD AUTO: 9.3 % (ref 5–12)
NEUTROPHILS NFR BLD AUTO: 6.18 10*3/MM3 (ref 1.7–7)
NEUTROPHILS NFR BLD AUTO: 71.8 % (ref 42.7–76)
NRBC BLD AUTO-RTO: 0 /100 WBC (ref 0–0.2)
PLATELET # BLD AUTO: 283 10*3/MM3 (ref 140–450)
PMV BLD AUTO: 10.6 FL (ref 6–12)
POTASSIUM SERPL-SCNC: 4.1 MMOL/L (ref 3.5–5.2)
PROT SERPL-MCNC: 7 G/DL (ref 6–8.5)
RBC # BLD AUTO: 4.11 10*6/MM3 (ref 3.77–5.28)
SODIUM SERPL-SCNC: 137 MMOL/L (ref 136–145)
TRIGL SERPL-MCNC: 77 MG/DL (ref 0–150)
VLDLC SERPL-MCNC: 14 MG/DL (ref 5–40)
WBC NRBC COR # BLD: 8.61 10*3/MM3 (ref 3.4–10.8)

## 2023-06-14 PROCEDURE — 80061 LIPID PANEL: CPT

## 2023-06-14 PROCEDURE — 80053 COMPREHEN METABOLIC PANEL: CPT

## 2023-06-14 PROCEDURE — 82306 VITAMIN D 25 HYDROXY: CPT

## 2023-06-14 PROCEDURE — 85025 COMPLETE CBC W/AUTO DIFF WBC: CPT

## 2023-06-14 NOTE — PROGRESS NOTES
QUICK REFERENCE INFORMATION:  The ABCs of the Annual Wellness Visit    Subsequent Medicare Wellness Visit    HEALTH RISK ASSESSMENT    1935    Recent Hospitalizations:  No hospitalization(s) within the last year..        Current Medical Providers:  Patient Care Team:  Juan Carlos Dunn MD as PCP - General (Internal Medicine)        Smoking Status:  Social History     Tobacco Use   Smoking Status Never   Smokeless Tobacco Never       Alcohol Consumption:  Social History     Substance and Sexual Activity   Alcohol Use No       Depression Screen:       2023     9:34 AM   PHQ-2/PHQ-9 Depression Screening   Little Interest or Pleasure in Doing Things 0-->not at all   Feeling Down, Depressed or Hopeless 1-->several days   PHQ-9: Brief Depression Severity Measure Score 1       Health Habits and Functional and Cognitive Screenin/14/2023     9:32 AM   Functional & Cognitive Status   Do you have difficulty preparing food and eating? Yes   Do you have difficulty bathing yourself, getting dressed or grooming yourself? No   Do you have difficulty using the toilet? No   Do you have difficulty moving around from place to place? Yes   Do you have trouble with steps or getting out of a bed or a chair? Yes   Current Diet Well Balanced Diet   Dental Exam Up to date   Eye Exam Up to date   Exercise (times per week) 0 times per week   Current Exercises Include No Regular Exercise   Do you need help using the phone?  No   Are you deaf or do you have serious difficulty hearing?  No   Do you need help with transportation? Yes   Do you need help shopping? Yes   Do you need help preparing meals?  Yes   Do you need help with housework?  Yes   Do you need help with laundry? Yes   Do you need help taking your medications? No   Do you need help managing money? No   Do you ever drive or ride in a car without wearing a seat belt? No   Have you felt unusual stress, anger or loneliness in the last month? No   Who do you live  with? Community   If you need help, do you have trouble finding someone available to you? Yes   Have you been bothered in the last four weeks by sexual problems? No   Do you have difficulty concentrating, remembering or making decisions? No       Fall Risk Screen:  LEANN Fall Risk Assessment was completed, and patient is at MODERATE risk for falls. Assessment completed on:6/14/2023    ACE III MINI        Does the patient have evidence of cognitive impairment? No    Aspirin use counseling: Taking ASA appropriately as indicated    Recent Lab Results:  CMP:  Lab Results   Component Value Date    BUN 14 06/03/2022    CREATININE 0.81 06/03/2022    EGFRIFNONA 81 06/02/2021    BCR 17.3 06/03/2022     06/03/2022    K 4.2 06/03/2022    CO2 23.6 06/03/2022    CALCIUM 9.4 06/03/2022    ALBUMIN 4.70 06/03/2022    BILITOT 0.5 06/03/2022    ALKPHOS 57 06/03/2022    AST 16 06/03/2022    ALT 12 06/03/2022     HbA1c:  Lab Results   Component Value Date    HGBA1C 5.48 11/26/2019     Microalbumin:  Lab Results   Component Value Date    MICROALBUR 6.3 03/13/2019     Lipid Panel  Lab Results   Component Value Date    CHOL 195 06/03/2022    TRIG 63 06/03/2022    HDL 68 (H) 06/03/2022     (H) 06/03/2022    AST 16 06/03/2022    ALT 12 06/03/2022       Visual Acuity:  No results found.    Age-appropriate Screening Schedule:  Refer to the list below for future screening recommendations based on patient's age, sex and/or medical conditions. Orders for these recommended tests are listed in the plan section. The patient has been provided with a written plan.    Health Maintenance   Topic Date Due    TDAP/TD VACCINES (1 - Tdap) Never done    ZOSTER VACCINE (2 of 3) 04/14/2009    DXA SCAN  07/17/2021    COVID-19 Vaccine (4 - Moderna series) 01/14/2022    LIPID PANEL  06/03/2023    ANNUAL WELLNESS VISIT  06/06/2023    INFLUENZA VACCINE  10/01/2023    Pneumococcal Vaccine 65+  Completed        Subjective   History of Present  Illness    Megan Garcia is a 88 y.o. female who presents for a Subsequent Wellness Visit and for follow-up of hypertension, hyperlipidemia, irritable bowel syndrome with constipation, urinary incontinence, scoliosis, and osteoporosis.     She is accompanied by an adult female who contributes to patient history and care.    Irritable bowel syndrome with constipation  The patient reports that her bowels are inconsistent at this time. She is mostly bothered by the constipation. The adult female reports that the gastroenterologist recommended that the patient use MiraLAX daily however, the patient is not consistent with taking it daily. She tends to go back and forth between constipation and loose stools. The loose stools bother her due to the urgency of them. She notes that she has difficulty with taking it daily.    Mobility limitations  The patient reports that her knees and hips have been doing well lately. The adult female reports that the patient has been walking very slowly lately. Today she presents in a wheelchair due to ease and accessibility. She has an electric scooter that she utilizes where she lives along with using a walker in her room. She adds that she can control her electric scooter very well. The adult female states that the patient struggles with changing her clothes and her mobility is becoming more restricted. She currently has a shower chair currently and is showering independently. The adult female expresses concern over the bathroom being too small for her scooter and therefore, the patient must hold on to furniture and appliances to transfer in and out of her bathroom. Her family is working on possibly moving her into a different facility with more care. Currently, she is living at [place] and she has a pull cord in the bathroom and next to her bed for emergencies.     Hypertension  The patient's blood pressure is well controlled and at goal today.    Health maintenance  The patient  reports that she has been feeling well overall. She denies chest pain or shortness of breath. She notes that her appetite is doing well. The adult female states that the patient is up to date on her COVID-19 vaccinations.     CHRONIC CONDITIONS    The following portions of the patient's history were reviewed and updated as appropriate: allergies, current medications, past family history, past medical history, past social history, past surgical history, and problem list.    Outpatient Medications Prior to Visit   Medication Sig Dispense Refill    acetaminophen (TYLENOL 8 HOUR ARTHRITIS PAIN) 650 MG 8 hr tablet Take 1 tablet by mouth Daily.      amLODIPine (NORVASC) 10 MG tablet TAKE ONE TABLET BY MOUTH DAILY 90 tablet 2    ASPIRIN 81 PO Take 81 mg by mouth Daily.      Calcium Carbonate Antacid (TUMS PO) Take 1 tablet by mouth 3 (Three) Times a Day With Meals.      CALCIUM PO Take 1 tablet by mouth Daily. 2 gummies  Tries to take it daily      clopidogrel (PLAVIX) 75 MG tablet Take 1 tablet by mouth Daily. 90 tablet 3    fexofenadine (ALLEGRA) 180 MG tablet Take 1 tablet by mouth Daily.      Multiple Vitamins-Minerals (MULTIVITAMIN ADULT PO) Take 1 tablet by mouth Daily.      naproxen sodium (ALEVE) 220 MG tablet Take 1 tablet by mouth At Night As Needed.      olmesartan (BENICAR) 40 MG tablet Take 1 tablet by mouth Daily. 90 tablet 1    polyethylene glycol (MIRALAX) 17 GM/SCOOP powder Take 17 g by mouth Every Other Day.       No facility-administered medications prior to visit.       Patient Active Problem List   Diagnosis    Allergic rhinitis    At risk for falls    Ataxic gait    Primary hypertension    Idiopathic scoliosis and kyphoscoliosis    Irritable bowel syndrome with constipation    Mixed hyperlipidemia    Non-allergic rhinitis    Osteoarthritis    Osteoporosis    Urinary incontinence    Primary osteoarthritis of both knees    Primary osteoarthritis of right hip       Advance Care Planning:  ACP discussion  was held with the patient during this visit. Patient has an advance directive in EMR which is still valid.     Identification of Risk Factors:  Risk factors include: Advance Directive Discussion  Fall Risk.    Review of Systems   Constitutional:  Negative for chills, fatigue and fever.   HENT:  Negative for congestion, ear pain and sinus pressure.    Respiratory:  Negative for cough, chest tightness, shortness of breath and wheezing.    Cardiovascular:  Negative for chest pain and palpitations.   Gastrointestinal:  Positive for constipation and diarrhea. Negative for abdominal pain and blood in stool.   Skin:  Negative for color change.   Allergic/Immunologic: Negative for environmental allergies.   Neurological:  Negative for dizziness, speech difficulty and headaches.   Psychiatric/Behavioral:  Negative for confusion. The patient is not nervous/anxious.      Compared to one year ago, the patient feels her physical health is the same.  Compared to one year ago, the patient feels her mental health is the same.    Objective     Physical Exam  Vitals and nursing note reviewed.   Constitutional:       Appearance: She is well-developed.      Comments: Presents in a wheelchair   HENT:      Head: Normocephalic and atraumatic.      Right Ear: External ear normal.      Left Ear: External ear normal.   Eyes:      Conjunctiva/sclera: Conjunctivae normal.      Pupils: Pupils are equal, round, and reactive to light.   Neck:      Thyroid: No thyromegaly.      Vascular: No JVD.      Trachea: No tracheal deviation.   Cardiovascular:      Rate and Rhythm: Normal rate and regular rhythm.      Heart sounds: Normal heart sounds. No murmur heard.  Pulmonary:      Effort: Pulmonary effort is normal. No respiratory distress.      Breath sounds: Normal breath sounds.   Abdominal:      General: Bowel sounds are normal. There is no distension.      Palpations: Abdomen is soft.      Tenderness: There is no abdominal tenderness.  "  Musculoskeletal:      Cervical back: Normal range of motion and neck supple.   Lymphadenopathy:      Cervical: No cervical adenopathy.   Skin:     General: Skin is warm and dry.      Capillary Refill: Capillary refill takes less than 2 seconds.   Neurological:      Mental Status: She is alert and oriented to person, place, and time.      Cranial Nerves: No cranial nerve deficit.   Psychiatric:         Behavior: Behavior normal.        Procedures     Vitals:    06/14/23 0934   BP: 108/62   BP Location: Left arm   Patient Position: Sitting   Cuff Size: Adult   Pulse: 84   Temp: 98.7 °F (37.1 °C)   Weight: 49.1 kg (108 lb 3.2 oz)   Height: 149.5 cm (58.86\")   PainSc:   5   PainLoc: Shoulder  Comment: right       BMI is within normal parameters. No other follow-up for BMI required.      Assessment & Plan   Problem List Items Addressed This Visit          Cardiac and Vasculature    Primary hypertension    Relevant Medications    olmesartan (BENICAR) 40 MG tablet    amLODIPine (NORVASC) 10 MG tablet    Other Relevant Orders    CBC & Differential    Comprehensive Metabolic Panel    Mixed hyperlipidemia    Relevant Orders    Comprehensive Metabolic Panel    Lipid Panel       Gastrointestinal Abdominal     Irritable bowel syndrome with constipation       Genitourinary and Reproductive     Urinary incontinence       Musculoskeletal and Injuries    Idiopathic scoliosis and kyphoscoliosis    Osteoporosis    Relevant Orders    Vitamin D,25-Hydroxy     Other Visit Diagnoses       Preventative health care    -  Primary            Patient Self-Management and Personalized Health Advice  The patient has been provided with information about: diet, exercise, and fall prevention and preventive services including:   Annual Wellness Visit (AWV).    Outpatient Encounter Medications as of 6/14/2023   Medication Sig Dispense Refill    acetaminophen (TYLENOL 8 HOUR ARTHRITIS PAIN) 650 MG 8 hr tablet Take 1 tablet by mouth Daily.      " amLODIPine (NORVASC) 10 MG tablet TAKE ONE TABLET BY MOUTH DAILY 90 tablet 2    ASPIRIN 81 PO Take 81 mg by mouth Daily.      Calcium Carbonate Antacid (TUMS PO) Take 1 tablet by mouth 3 (Three) Times a Day With Meals.      CALCIUM PO Take 1 tablet by mouth Daily. 2 gummies  Tries to take it daily      clopidogrel (PLAVIX) 75 MG tablet Take 1 tablet by mouth Daily. 90 tablet 3    fexofenadine (ALLEGRA) 180 MG tablet Take 1 tablet by mouth Daily.      Multiple Vitamins-Minerals (MULTIVITAMIN ADULT PO) Take 1 tablet by mouth Daily.      naproxen sodium (ALEVE) 220 MG tablet Take 1 tablet by mouth At Night As Needed.      olmesartan (BENICAR) 40 MG tablet Take 1 tablet by mouth Daily. 90 tablet 1    polyethylene glycol (MIRALAX) 17 GM/SCOOP powder Take 17 g by mouth Every Other Day.       No facility-administered encounter medications on file as of 6/14/2023.     Plan:    1. Prevention  - Given her mobility limitations and bowel and bladder issues, she is doing quite well. Her problems are managed. I think at a maximum level, my main concern is risk of falls during transfers and her daughter recognizes that and is looking into a situation where she has more help available.    2. Hypertension  - Blood pressure is well controlled on amlodipine and olmesartan.    3. Hyperlipidemia  - Lipid panel is pending. The treatment remains healthy diet.    4. Irritable bowel syndrome with constipation prominence  - Continue MiraLAX every other day or as needed.    5. Urinary incontinence  - She manages this reasonably well.    6. Scoliosis, kyphoscoliosis, and osteoarthritis  - She now has a scooter to get around and has no significant pain. She takes naproxen at bedtime.    7. Osteoporosis  - Continue vitamin D and calcium.    Follow up in 6 months.      Reviewed use of high risk medication in the elderly: yes  Reviewed for potential of harmful drug interactions in the elderly: yes    Follow Up:  Return in about 6 months (around  12/14/2023) for follow up.     There are no Patient Instructions on file for this visit.    An After Visit Summary and PPPS with all of these plans were given to the patient.         Transcribed from ambient dictation for Juan Carlos Dunn MD by Susana Cassidy.  06/14/23   12:05 EDT    Patient or patient representative verbalized consent to the visit recording.  I have personally performed the services described in this document as transcribed by the above individual, and it is both accurate and complete.

## 2023-08-15 RX ORDER — AMLODIPINE BESYLATE 10 MG/1
TABLET ORAL
Qty: 90 TABLET | Refills: 2 | Status: SHIPPED | OUTPATIENT
Start: 2023-08-15

## 2023-09-11 RX ORDER — OLMESARTAN MEDOXOMIL 40 MG/1
TABLET ORAL
Qty: 90 TABLET | Refills: 1 | Status: SHIPPED | OUTPATIENT
Start: 2023-09-11

## 2023-09-20 ENCOUNTER — TELEPHONE (OUTPATIENT)
Dept: INTERNAL MEDICINE | Facility: CLINIC | Age: 88
End: 2023-09-20
Payer: MEDICARE
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
## 2023-09-20 DIAGNOSIS — R26.89 LOSS OF BALANCE: ICD-10-CM

## 2023-09-20 NOTE — TELEPHONE ENCOUNTER
Pt contacted  to see if she can get  PT.  She had a fall last nite (no injury) & would like PT to work on balance.  She has used them in the past.

## 2023-09-21 ENCOUNTER — HOME HEALTH ADMISSION (OUTPATIENT)
Dept: HOME HEALTH SERVICES | Facility: HOME HEALTHCARE | Age: 88
End: 2023-09-21
Payer: MEDICARE

## 2023-09-26 ENCOUNTER — TELEPHONE (OUTPATIENT)
Dept: INTERNAL MEDICINE | Facility: CLINIC | Age: 88
End: 2023-09-26
Payer: MEDICARE

## 2023-09-26 NOTE — TELEPHONE ENCOUNTER
LISETH FROM [facility] IS NEEDING VERBAL ORDERS FOR PHYSICAL THERAPY. TWICE A WEEK FOR 2 WEEKS AND ONCE A WEEK FOR 5 WEEKS.

## 2023-09-29 ENCOUNTER — OFFICE VISIT (OUTPATIENT)
Dept: INTERNAL MEDICINE | Facility: CLINIC | Age: 88
End: 2023-09-29
Payer: MEDICARE

## 2023-09-29 ENCOUNTER — TELEPHONE (OUTPATIENT)
Dept: INTERNAL MEDICINE | Facility: CLINIC | Age: 88
End: 2023-09-29

## 2023-09-29 VITALS
HEIGHT: 59 IN | SYSTOLIC BLOOD PRESSURE: 102 MMHG | WEIGHT: 99.4 LBS | BODY MASS INDEX: 20.04 KG/M2 | DIASTOLIC BLOOD PRESSURE: 68 MMHG | TEMPERATURE: 98.4 F | HEART RATE: 68 BPM

## 2023-09-29 DIAGNOSIS — R35.0 FREQUENCY OF URINATION: Primary | ICD-10-CM

## 2023-09-29 LAB
BILIRUB BLD-MCNC: NEGATIVE MG/DL
CLARITY, POC: ABNORMAL
COLOR UR: YELLOW
EXPIRATION DATE: ABNORMAL
GLUCOSE UR STRIP-MCNC: NEGATIVE MG/DL
KETONES UR QL: ABNORMAL
LEUKOCYTE EST, POC: ABNORMAL
Lab: ABNORMAL
NITRITE UR-MCNC: NEGATIVE MG/ML
PH UR: 7 [PH] (ref 5–8)
PROT UR STRIP-MCNC: ABNORMAL MG/DL
RBC # UR STRIP: ABNORMAL /UL
SP GR UR: 1.02 (ref 1–1.03)
UROBILINOGEN UR QL: ABNORMAL

## 2023-09-29 PROCEDURE — 81003 URINALYSIS AUTO W/O SCOPE: CPT

## 2023-09-29 PROCEDURE — 1160F RVW MEDS BY RX/DR IN RCRD: CPT

## 2023-09-29 PROCEDURE — 87086 URINE CULTURE/COLONY COUNT: CPT

## 2023-09-29 PROCEDURE — 1159F MED LIST DOCD IN RCRD: CPT

## 2023-09-29 PROCEDURE — 99213 OFFICE O/P EST LOW 20 MIN: CPT

## 2023-09-29 RX ORDER — NITROFURANTOIN 25; 75 MG/1; MG/1
100 CAPSULE ORAL 2 TIMES DAILY
Qty: 14 CAPSULE | Refills: 0 | Status: SHIPPED | OUTPATIENT
Start: 2023-09-29 | End: 2023-10-06

## 2023-09-29 NOTE — TELEPHONE ENCOUNTER
Caller: FRAZIERYOLA    Relationship: Emergency Contact    Best call back number: [phone]     What medication are you requesting: SOMETHING TO HELP WITH SYMPTOMS    What are your current symptoms: MEMORY ISSUES, CONFUSION, STRONG URINE ODOR    How long have you been experiencing symptoms: COULD BE A COUPLE WEEKS BUT NOT SURE    Have you had these symptoms before:    [x] Yes  [] No    Have you been treated for these symptoms before:   [x] Yes  [] No    If a prescription is needed, what is your preferred pharmacy and phone number: [facility] [id number] - [place] [address] [address] [place]  AT [facility] [address] - [phone]  - [phone] FX     Additional notes:  PLEASE ADVISE PATIENT'S DAUGHTER IF A PRESCRIPTION WILL BE CALLED IN OR IF THERE ARE ANY QUESTIONS/CONCERNS.

## 2023-09-29 NOTE — PROGRESS NOTES
Acute Office Visit      Name: Megan Garcia    : 1935     MRN: [id number]   Care Team: Patient Care Team:  Juan Carlos Dunn MD as PCP - General (Internal Medicine)    Chief Complaint  Urinary Tract Infection    Subjective     History of Present Illness:    Megan Garcia presents today for foul odor urine and increasing confusion. She is accompanied by her daughter-in-law.     She denies urinary frequency, dysuria or lower back pain. She feels normal and eats regularly. Her daughter-in-law conveys she observed the urine in her brief which was malodorous and dark colored.    Her daughter-in-law reports that the patient fell out of her bed on 2023 causing her pain in the lumbar region of her back. Her most recent fall was 2023 which caused her bilateral lower extremity pain. She denies bleeding, abrasions or contusions after her fall.    Review of Systems  Increased confusion  Dark colored, malodorous urine  Lower back pain  Bilateral lower leg pain      Review of Systems:    Past Medical History:   Diagnosis Date    Allergic     Arthritis     Hyperlipidemia     Hypertension     Intervertebral disc displacement     Left periventricular lacunar stroke 10/19/2015       Past Surgical History:   Procedure Laterality Date    LAMINECTOMY  1974    TOTAL KNEE ARTHROPLASTY Left        Social History     Socioeconomic History    Marital status:    Tobacco Use    Smoking status: Never    Smokeless tobacco: Never   Substance and Sexual Activity    Alcohol use: No    Drug use: No    Sexual activity: Defer         Current Outpatient Medications:     acetaminophen (TYLENOL 8 HOUR ARTHRITIS PAIN) 650 MG 8 hr tablet, Take 1 tablet by mouth Daily., Disp: , Rfl:     amLODIPine (NORVASC) 10 MG tablet, TAKE ONE TABLET BY MOUTH DAILY, Disp: 90 tablet, Rfl: 2    ASPIRIN 81 PO, Take 81 mg by mouth Daily., Disp: , Rfl:     Calcium Carbonate Antacid (TUMS PO), Take 1 tablet by mouth 3  "(Three) Times a Day With Meals., Disp: , Rfl:     CALCIUM PO, Take 1 tablet by mouth Daily. 2 gummies  Tries to take it daily, Disp: , Rfl:     clopidogrel (PLAVIX) 75 MG tablet, Take 1 tablet by mouth Daily., Disp: 90 tablet, Rfl: 3    fexofenadine (ALLEGRA) 180 MG tablet, Take 1 tablet by mouth Daily., Disp: , Rfl:     Multiple Vitamins-Minerals (MULTIVITAMIN ADULT PO), Take 1 tablet by mouth Daily., Disp: , Rfl:     olmesartan (BENICAR) 40 MG tablet, TAKE ONE TABLET BY MOUTH DAILY, Disp: 90 tablet, Rfl: 1    polyethylene glycol (MIRALAX) 17 GM/SCOOP powder, Take 17 g by mouth Every Other Day., Disp: , Rfl:     naproxen sodium (ALEVE) 220 MG tablet, Take 1 tablet by mouth At Night As Needed. (Patient not taking: Reported on 9/29/2023), Disp: , Rfl:     Procedures    PHQ-9 Total Score:      Objective     Vital Signs  /68 (BP Location: Left arm, Patient Position: Sitting, Cuff Size: Adult)   Pulse 68   Temp 98.4 °F (36.9 °C) (Infrared)   Ht 149.5 cm (58.86\")   Wt 45.1 kg (99 lb 6.4 oz)   BMI 20.17 kg/m²   Estimated body mass index is 20.17 kg/m² as calculated from the following:    Height as of this encounter: 149.5 cm (58.86\").    Weight as of this encounter: 45.1 kg (99 lb 6.4 oz).    BMI is within normal parameters. No other follow-up for BMI required.      Physical Exam  Vitals and nursing note reviewed.   HENT:      Head: Normocephalic.   Cardiovascular:      Rate and Rhythm: Normal rate.   Pulmonary:      Effort: Pulmonary effort is normal.      Breath sounds: Normal breath sounds.   Skin:     General: Skin is warm and dry.   Neurological:      General: No focal deficit present.      Mental Status: She is alert and oriented to person, place, and time.   Psychiatric:         Mood and Affect: Mood normal.         Behavior: Behavior normal.         Thought Content: Thought content normal.         Judgment: Judgment normal.            Assessment and Plan     Assessment/Plan:  Diagnoses and all orders " for this visit:    1. Frequency of urination (Primary)  -     POCT urinalysis dipstick, automated  -     Urine Culture - Urine, Urine, Clean Catch; Future  -     Urine Culture - Urine, Urine, Clean Catch  - I am going to place her on a broad-spectrum antibiotic 2 times a day for 7 days with food. Her urine will be sent for culture and sensitivity. Advised her to maintain adequate rest. Start first dose today.    Urinalysis reviewed from 09/29/23 which revealed an increased WBC count and the presence of RBC's.      There are no Patient Instructions on file for this visit.  Plan of care reviewed with patient at the conclusion of today's visit. Education was provided regarding diagnosis, management and any prescribed or recommended OTC medications.  Patient verbalizes understanding of and agreement with management plan.    Follow Up  No follow-ups on file.    PAMELA To     Transcribed from ambient dictation for PAMELA To by Gracie Gonzalez.   09/29/23   17:58 EDT    Patient or patient representative verbalized consent to the visit recording.  I have personally performed the services described in this document as transcribed by the above individual, and it is both accurate and complete.

## 2023-09-30 LAB — BACTERIA SPEC AEROBE CULT: NORMAL

## 2023-10-13 ENCOUNTER — TELEPHONE (OUTPATIENT)
Dept: INTERNAL MEDICINE | Facility: CLINIC | Age: 88
End: 2023-10-13

## 2023-10-13 ENCOUNTER — OFFICE VISIT (OUTPATIENT)
Dept: INTERNAL MEDICINE | Facility: CLINIC | Age: 88
End: 2023-10-13
Payer: MEDICARE

## 2023-10-13 VITALS
WEIGHT: 99 LBS | HEART RATE: 76 BPM | HEIGHT: 59 IN | SYSTOLIC BLOOD PRESSURE: 106 MMHG | DIASTOLIC BLOOD PRESSURE: 60 MMHG | TEMPERATURE: 98.4 F | BODY MASS INDEX: 19.96 KG/M2

## 2023-10-13 DIAGNOSIS — R30.0 DYSURIA: Primary | ICD-10-CM

## 2023-10-13 DIAGNOSIS — B37.49 CANDIDA INFECTION OF GENITAL REGION: ICD-10-CM

## 2023-10-13 DIAGNOSIS — R27.0 ATAXIA: ICD-10-CM

## 2023-10-13 PROCEDURE — 1160F RVW MEDS BY RX/DR IN RCRD: CPT | Performed by: INTERNAL MEDICINE

## 2023-10-13 PROCEDURE — 1159F MED LIST DOCD IN RCRD: CPT | Performed by: INTERNAL MEDICINE

## 2023-10-13 PROCEDURE — 99213 OFFICE O/P EST LOW 20 MIN: CPT | Performed by: INTERNAL MEDICINE

## 2023-10-13 RX ORDER — NYSTATIN 100000 [USP'U]/G
POWDER TOPICAL 3 TIMES DAILY
Qty: 60 G | Refills: 2 | Status: SHIPPED | OUTPATIENT
Start: 2023-10-13

## 2023-10-13 NOTE — TELEPHONE ENCOUNTER
Please call [facility] and see what we need to do to get PT, she's at [facility].

## 2023-10-13 NOTE — PROGRESS NOTES
[facility]     Megan Garcia  1935   [id number]      Patient Care Team:  Juan Carlos Dunn MD as PCP - General (Internal Medicine)    Chief Complaint::   Chief Complaint   Patient presents with    Rash     bottom    Urinary Tract Infection    frequent falls        HPI  This appointment was made by home health because of a rash on her bottom and frequent falls. She is accompanied by her daughter.    Ataxia  The patient's daughter states that originally the [facility] therapist scheduled this appointment after she had a fall. She states that he came in and he suggested to her that she should have therapy. They did not ask for therapy, but he suggested that. He called and scheduled an appointment here on a day that she could not come, so she called back and rescheduled the appointment. She is not sure what he did. She states that Jany called her and told her that there is a note on her calendar that says she has an appointment with Dr. Dunn on 10/10/2023. She states that she rescheduled it for today. She states that when they called to confirm it, she hit the no button. She states that they called and put her number as the contact.    UTI  On 09/29/2023 she was diagnosed with a UTI and was on. Started on a course of antibiotics. She had missed some does because they were in transition of changing home health agencies. The previous companions were supposed to be coming in to check on her. During this time, the UTI had an obvious odor. She has fallen several times in the past weeks.    Dysuria  The patient's daughter states that she has a UTI. She states that her sister-in-law brought her on 09/29/2023 and they tested her, and she had a UTI. She states that they started her on antibiotics. She states that she missed some doses. She states that home health is supposed to be coming in and checking on her, but it was in a transitional period and somehow medicines got missed. She states that  Jany said she could still smell the evidence of a UTI. She states that she doubled up one thing and got all her antibiotics in her. She states that a couple of days ago, she thought she could still smell the evidence of a UTI. She states that she could not get one today. She states that [facility] is taking care of her now. She states that they are supposed to be doing an M and PM assist right now because she cannot walk. She states that she has fallen 5 times in the last couple of weeks. She states that she has been making that sound for a long time, but it is more pronounced. She states that it has to do with any kind of exertion at all. She states that it gets loud when she is frustrated.    Bed sores  The patient's daughter states that they are concerned about bed sores. She states that they have been coming in and helping her change. The patient is not able to get up and go to the bathroom at night. The daughter states that the home health nurse and her father and sister-in-law are both concerned that she might have bed sores. She states that home health did look at her backside. She states that they are trying to determine if it is bedsores or a rash. She states that they do not know what to put on her. She states that someone had suggested nystatin. She states that she is incontinent almost 100% of the time. The staff would come in and ask her if she needed changing and she would say no. So now they are coming in checking.    Verbal noises  She is making a continual grunting noise. The daughter states she has been doing it for a long time and it has gotten more pronounced. She especially does it with any kind of exertion of it she is upset. When she gets frustrated, she gets louder.         Chronic Conditions:      Patient Active Problem List   Diagnosis    Allergic rhinitis    At risk for falls    Ataxic gait    Primary hypertension    Idiopathic scoliosis and kyphoscoliosis    Irritable bowel syndrome  with constipation    Mixed hyperlipidemia    Non-allergic rhinitis    Osteoarthritis    Osteoporosis    Urinary incontinence    Primary osteoarthritis of both knees    Primary osteoarthritis of right hip        Past Medical History:   Diagnosis Date    Allergic     Arthritis     Hyperlipidemia     Hypertension     Intervertebral disc displacement     Left periventricular lacunar stroke 10/19/2015       Past Surgical History:   Procedure Laterality Date    LAMINECTOMY  1974    TOTAL KNEE ARTHROPLASTY Left 2004       Family History   Problem Relation Age of Onset    Coronary artery disease Father        Social History     Socioeconomic History    Marital status:    Tobacco Use    Smoking status: Never    Smokeless tobacco: Never   Substance and Sexual Activity    Alcohol use: No    Drug use: No    Sexual activity: Defer       No Known Allergies      Current Outpatient Medications:     acetaminophen (TYLENOL 8 HOUR ARTHRITIS PAIN) 650 MG 8 hr tablet, Take 1 tablet by mouth Daily., Disp: , Rfl:     amLODIPine (NORVASC) 10 MG tablet, TAKE ONE TABLET BY MOUTH DAILY, Disp: 90 tablet, Rfl: 2    ASPIRIN 81 PO, Take 81 mg by mouth Daily., Disp: , Rfl:     Calcium Carbonate Antacid (TUMS PO), Take 1 tablet by mouth 3 (Three) Times a Day With Meals., Disp: , Rfl:     CALCIUM PO, Take 1 tablet by mouth Daily. 2 gummies  Tries to take it daily, Disp: , Rfl:     clopidogrel (PLAVIX) 75 MG tablet, Take 1 tablet by mouth Daily., Disp: 90 tablet, Rfl: 3    fexofenadine (ALLEGRA) 180 MG tablet, Take 1 tablet by mouth Daily., Disp: , Rfl:     Multiple Vitamins-Minerals (MULTIVITAMIN ADULT PO), Take 1 tablet by mouth Daily., Disp: , Rfl:     olmesartan (BENICAR) 40 MG tablet, TAKE ONE TABLET BY MOUTH DAILY, Disp: 90 tablet, Rfl: 1    polyethylene glycol (MIRALAX) 17 GM/SCOOP powder, Take 17 g by mouth Every Other Day., Disp: , Rfl:     nystatin (MYCOSTATIN) 510806 UNIT/GM powder, Apply  topically to the appropriate area as  "directed 3 (Three) Times a Day., Disp: 60 g, Rfl: 2    Review of Systems \Review of systems is otherwise unremarkable.      Vital Signs  Vitals:    10/13/23 1635   BP: 106/60   BP Location: Left arm   Patient Position: Sitting   Cuff Size: Adult   Pulse: 76   Temp: 98.4 øF (36.9 øC)   Weight: 44.9 kg (99 lb)   Height: 149.5 cm (58.86\")   PainSc: 0-No pain       Physical Exam  Vitals reviewed.   Constitutional:       Appearance: She is well-developed.   HENT:      Head: Normocephalic and atraumatic.   Cardiovascular:      Rate and Rhythm: Normal rate and regular rhythm.      Heart sounds: Normal heart sounds. No murmur heard.  Pulmonary:      Effort: Pulmonary effort is normal.      Breath sounds: Normal breath sounds.   Neurological:      Mental Status: She is alert and oriented to person, place, and time.        She has difficulty getting up. She is very unsteady on her feet. She has an erythematous rash in the buttocks and the intertriginous area. There is no skin breakdown.    Procedures    ACE III MINI             Assessment/Plan:    Diagnoses and all orders for this visit:    1. Dysuria (Primary)  -     Urinalysis With Culture If Indicated - Urine, Clean Catch; Future    2. Ataxia    3. Candida infection of genital region    Other orders  -     nystatin (MYCOSTATIN) 974675 UNIT/GM powder; Apply  topically to the appropriate area as directed 3 (Three) Times a Day.  Dispense: 60 g; Refill: 2        1. Ataxia  - We will ask home health physical therapy to continue to work with her.    2. Dysuria  - Unfortunately, she was not able to produce a urine specimen. Given mild recurrent symptoms, I do not think I should treat her without a culture given her daughter in order to take to the nursing facility to see if they can obtain urinalysis with culture.    3. Candida infection of the buttocks and groin  - She was given nystatin powder.    Follow up as needed.      Plan of care reviewed with patient at the conclusion of " today's visit. Education was provided regarding diagnosis, management, and any prescribed or recommended OTC medications.Patient verbalizes understanding of and agreement with management plan.         Juan Carlos Dunn MD      Transcribed from ambient dictation for Juan Carlos Dunn MD by Mino Clements.  10/13/23   19:02 EDT    Patient or patient representative verbalized consent to the visit recording.  I have personally performed the services described in this document as transcribed by the above individual, and it is both accurate and complete.

## 2023-10-16 RX ORDER — CLOPIDOGREL BISULFATE 75 MG/1
75 TABLET ORAL DAILY
Qty: 90 TABLET | Refills: 3 | Status: SHIPPED | OUTPATIENT
Start: 2023-10-16

## 2023-10-18 ENCOUNTER — TELEPHONE (OUTPATIENT)
Dept: INTERNAL MEDICINE | Facility: CLINIC | Age: 88
End: 2023-10-18
Payer: MEDICARE

## 2023-10-18 NOTE — TELEPHONE ENCOUNTER
LISETH WITH [facility] REQUESTING A VERBAL ORDER FOR OCCUPATIONAL THERAPY FOR FUNCTION DECLINE/ADL  AND TO INCREASE PHYSICAL THERAPY TO 2X A WEEK. PLEASE CALL LISETH

## 2023-10-20 ENCOUNTER — OUTSIDE FACILITY SERVICE (OUTPATIENT)
Dept: INTERNAL MEDICINE | Facility: CLINIC | Age: 88
End: 2023-10-20
Payer: MEDICARE

## 2023-10-24 ENCOUNTER — TELEPHONE (OUTPATIENT)
Dept: INTERNAL MEDICINE | Facility: CLINIC | Age: 88
End: 2023-10-24

## 2023-10-24 NOTE — TELEPHONE ENCOUNTER
We are going to need to get a home health nurse out there to manage this.  Please find out what agency her facility uses.

## 2023-10-24 NOTE — TELEPHONE ENCOUNTER
Called and spoke with Nataliia. Patient already uses [facility]  and she stated they would be contacting us. I went ahead and called them and spoke with Gauri. I gave her VO for nursing. They will see the patient tomorrow.

## 2023-10-24 NOTE — TELEPHONE ENCOUNTER
Caller: YOLA FRAZIER    Relationship: Emergency Contact    Best call back number: [phone]     What is the best time to reach you: ANY    Who are you requesting to speak with (clinical staff, provider,  specific staff member): NURSE    Do you know the name of the person who called: DAUGHTER    What was the call regarding: THE SORES NOW LOOK LIKE BED SORES.  WHAT SHOULD WE BE PUTTING ON IT?  LOOKS WORSE THAN WHEN SHE HAD APPOINTMENT.      Is it okay if the provider responds through MyChart: PHONE CALL

## 2023-10-27 ENCOUNTER — TELEPHONE (OUTPATIENT)
Dept: INTERNAL MEDICINE | Facility: CLINIC | Age: 88
End: 2023-10-27
Payer: MEDICARE

## 2023-11-08 ENCOUNTER — TELEPHONE (OUTPATIENT)
Dept: INTERNAL MEDICINE | Facility: CLINIC | Age: 88
End: 2023-11-08
Payer: MEDICARE

## 2023-11-08 RX ORDER — SULFAMETHOXAZOLE AND TRIMETHOPRIM 800; 160 MG/1; MG/1
1 TABLET ORAL 2 TIMES DAILY
Qty: 10 TABLET | Refills: 0 | Status: SHIPPED | OUTPATIENT
Start: 2023-11-08

## 2023-11-08 NOTE — TELEPHONE ENCOUNTER
LUCIA DEL CID DAUGHTER YOLA FRAZIER CALLED AND STATED HER MOM MAY HAVE AN UTI. LUCIA'S URINE WAS GREEN,THICK AND HAD AN STRONG SMELL THIS MORNING. SHE ALSO HAS SOME CONFUSION. SHE HAS BEEN UNABLE TO GET IN TOUCH WITH HOME HEALTH THAT COMES OVER TO HELP WITH HER MOM.

## 2023-11-10 ENCOUNTER — TELEPHONE (OUTPATIENT)
Dept: INTERNAL MEDICINE | Facility: CLINIC | Age: 88
End: 2023-11-10
Payer: MEDICARE

## 2023-11-13 ENCOUNTER — TELEPHONE (OUTPATIENT)
Dept: INTERNAL MEDICINE | Facility: CLINIC | Age: 88
End: 2023-11-13
Payer: MEDICARE

## 2023-11-13 NOTE — TELEPHONE ENCOUNTER
MELIDA WITH [facility] REQUESTING A CALL BACK. SHE NEEDS CLARIFICATION ON THE DOSES FOR PATIENTS CALCIUM TO BE TAKEN 1X DAILY  AND TUMS HOW MANY TIMES A DAY WITH MEALS AND DOSAGE.    PLEASE CALL

## 2023-11-13 NOTE — TELEPHONE ENCOUNTER
She was taking one tablet 3 times a day with meals, but suggest we change it to 1 tablet with breakfast, 1 with supper.

## 2023-11-13 NOTE — TELEPHONE ENCOUNTER
Is that for calcium or tums or both?     They also need to know if you need regular tums or extra strength tums. They also need the dose of calcium.

## 2023-11-14 ENCOUNTER — TELEPHONE (OUTPATIENT)
Dept: INTERNAL MEDICINE | Facility: CLINIC | Age: 88
End: 2023-11-14
Payer: MEDICARE

## 2023-11-14 NOTE — TELEPHONE ENCOUNTER
OSCAR FROM [facility] CALLED AND STATED THEY RECEIVED A PARTIAL FAX FROM US ON 11/10/23.  DO YOU CALL THIS?  THEY NEED THIS FAXED BACK OVER TO THEM.    PLEASE FAX -[phone]

## 2023-11-15 ENCOUNTER — TELEPHONE (OUTPATIENT)
Dept: INTERNAL MEDICINE | Facility: CLINIC | Age: 88
End: 2023-11-15

## 2023-11-15 NOTE — TELEPHONE ENCOUNTER
Called Nataliia and she said her sister-in-law had gotten a call today from [facility] that pt is requiring help with feeding and is unable to stand on her own. They were told they would need to hire outside caregivers to help her. Advised Nataliia to talk with Joshua and find out what needs to be done. Notified Dr. Dunn and he said they would need to look at Nursing home skilled care. Discussed with Nataliia and she voices understanding

## 2023-11-15 NOTE — TELEPHONE ENCOUNTER
Caller: YOLA FRAZIER    Relationship: Emergency Contact    Best call back number:[phone]       What was the call regarding: PATIENT WAS SENT TO [facility] BUT AFTER 2 DAYS THEY TOLD PATIENTS DAUGHTER PATIENT HAD DECLINE SO MUCH THEY COULD NOT TAKE CARE OF HER ANYMORE. PATIENTS DAUGHTER WOULD LIKE A CALL BACK TO DISCUSS OPTIONS.

## 2023-11-15 NOTE — TELEPHONE ENCOUNTER
MELIDA FROM [facility] CALLED AND WANTS TO SPEAK TO LUIZ ONLY.    PLEASE CALL MELIDA [phone].  MELIDA IS IN A CONFERENCE RIGHT NOW AND NOT SURE IF SHE CAN GET AWAY TO ANSWER THE CALL.  PLEASE LVM FOR HER IF SHE DOESN'T ANSWER.

## 2023-11-15 NOTE — TELEPHONE ENCOUNTER
Called and spoke to Rosa at [facility]. She said pt family took her to [facility] this afternoon

## 2023-11-17 NOTE — TELEPHONE ENCOUNTER
Althea returned call. Advised we only rec'd page with questions 5-8. She will fax the 1st page and requests that and previous page faxed back to [phone]

## 2023-11-27 ENCOUNTER — TELEPHONE (OUTPATIENT)
Dept: INTERNAL MEDICINE | Facility: CLINIC | Age: 88
End: 2023-11-27
Payer: MEDICARE

## 2023-11-29 NOTE — PROGRESS NOTES
Nursing Home History and Physical       Maykel CandaceDO zack  [address] [place] [address] Phone: [phone]  Fax: [phone]     PATIENT NAME: Megan Garcia                                                                          YOB: 1935           DATE OF SERVICE: 11/30/2023  FACILITY:  [facility]    CHIEF COMPLAINT:  Nursing facility admission      HISTORY OF PRESENT ILLNESS:   The patient is an 88-year-old female with a history of hyperlipidemia, hypertension, and CVA. She was recently hospitalized at [facility] on 11/15/2023 for severe sepsis with septic shock due to a UTI. The patient presented with a creatinine level of 3.58 mg/dL as well as a urinalysis consistent with a UTI. The patient was treated with aggressive IV fluids, and diuretics were held. She was also treated with cefepime for recurrent UTIs, and after stabilization, she was transferred to this facility for further strengthening and rehabilitation due to weakness from her acute illness.    On exam today, patient was comfortable.  Daughter was at bedside and shared concerns about buttocks rash and redness.  Renal function had not yet been followed up since admission to this facility.  Urine output was noted to be dark just today.        PAST MEDICAL & SURGICAL HISTORY:   Past Medical History:   Diagnosis Date    Allergic     Arthritis     Hyperlipidemia     Hypertension     Intervertebral disc displacement     Left periventricular lacunar stroke 10/19/2015      Past Surgical History:   Procedure Laterality Date    LAMINECTOMY  1974    TOTAL KNEE ARTHROPLASTY Left 2004         MEDICATIONS:  I have reviewed and reconciled the patients medication list in the patients chart at the skilled nursing facility on 11/30/2023.      ALLERGIES:  No Known Allergies      SOCIAL HISTORY:  Social History     Socioeconomic History    Marital status:    Tobacco Use    Smoking status: Never    Smokeless tobacco:  Never   Substance and Sexual Activity    Alcohol use: No    Drug use: No    Sexual activity: Defer       FAMILY HISTORY:  Family History   Problem Relation Age of Onset    Coronary artery disease Father         REVIEW OF SYSTEMS:  Review of Systems   Constitutional:  Negative for chills, fatigue and fever.   HENT:  Negative for congestion, ear pain, rhinorrhea, sinus pressure and sore throat.    Eyes:  Negative for visual disturbance.   Respiratory:  Negative for cough, chest tightness, shortness of breath and wheezing.    Cardiovascular:  Negative for chest pain, palpitations and leg swelling.   Gastrointestinal:  Negative for abdominal pain, blood in stool, constipation, diarrhea, nausea and vomiting.   Endocrine: Negative for polydipsia and polyuria.   Genitourinary:  Negative for dysuria and hematuria.   Musculoskeletal:  Negative for arthralgias and back pain.   Skin:  Positive for rash.   Neurological:  Negative for dizziness, light-headedness, numbness and headaches.   Psychiatric/Behavioral:  Negative for dysphoric mood and sleep disturbance. The patient is not nervous/anxious.          PHYSICAL EXAMINATION:   VITAL SIGNS: /66   Pulse 82   Temp 97.7 °F (36.5 °C)   Resp 16   SpO2 96%     Physical Exam  Vitals and nursing note reviewed.   Constitutional:       Appearance: Normal appearance. She is well-developed.      Comments: Frail elderly female resting comfortably in bed   HENT:      Head: Normocephalic and atraumatic.      Nose: Nose normal.      Mouth/Throat:      Mouth: Mucous membranes are moist.      Pharynx: No oropharyngeal exudate.   Eyes:      General: No scleral icterus.     Conjunctiva/sclera: Conjunctivae normal.      Pupils: Pupils are equal, round, and reactive to light.   Neck:      Thyroid: No thyromegaly.   Cardiovascular:      Rate and Rhythm: Normal rate and regular rhythm.      Heart sounds: Normal heart sounds. No murmur heard.     No friction rub. No gallop.   Pulmonary:       Effort: Pulmonary effort is normal. No respiratory distress.      Breath sounds: Normal breath sounds. No wheezing.   Abdominal:      General: Bowel sounds are normal. There is no distension.      Palpations: Abdomen is soft.      Tenderness: There is no abdominal tenderness.   Musculoskeletal:         General: No deformity or signs of injury.      Cervical back: Normal range of motion and neck supple.   Lymphadenopathy:      Cervical: No cervical adenopathy.   Skin:     General: Skin is warm and dry.      Findings: Erythema (buttocks) and rash present.   Neurological:      Mental Status: She is alert and oriented to person, place, and time.   Psychiatric:         Mood and Affect: Mood normal.         Behavior: Behavior normal.         RECORDS REVIEW:   Discharge Summary from [facility] 11/21/2023    ASSESSMENT   Diagnoses and all orders for this visit:    1. Acute renal failure, unspecified acute renal failure type (Primary)    2. Recurrent UTI    3. Hypothyroidism, unspecified type    4. Essential hypertension    5. Urinary incontinence, unspecified type    6. Lacunar stroke        PLAN  Acute renal failure  Creatinine had stabilized to 0.48 mg/dL by the time of discharge from [facility] on 11/21/2023. Diuretics were held during hospitalization. NSAIDs are to be avoided in the facility. Renal function will be monitored in the facility.  CBC and CMP ordered    Dark urine may be a result of dehydration.  Patient encouraged to drink more water.    Buttocks candidiasis  -Start miconazole/zinc oxide/silver sulfasalazine topical    Recurrent UTI  Completing cefepime.    Hypothyroidism  Continue Synthroid.     Hypertension  Stable on amlodipine and Benicar.    History of lacunar stroke  Continue Plavix daily.       49 Min spent with direct patient care, review of medical chart, discussion with nursing staff, and medical decision making.      [x]  Discussed Patient in detail with nursing/staff,  addressed all needs today.     [x]  Plan of Care Reviewed   [x]  PT/OT Reviewed   [x]  Order Changes  []  Discharge Plans Reviewed  [x]  Advance Directive on file with Nursing Home.   [x]  POA on file with Nursing Home.    [x]  Code Status listed and reviewed.       Maykel Maria DO.  11/30/2023      **Part of this note may be an electronic transcription/translation of spoken language to printed text using the Dragon Dictation System.**      Transcribed from ambient dictation for Maykel Maria DO by Evelyn Seth.  11/30/23   12:32 EST    Patient or patient representative verbalized consent to the visit recording.  I have personally performed the services described in this document as transcribed by the above individual, and it is both accurate and complete.

## 2023-11-30 ENCOUNTER — TELEPHONE (OUTPATIENT)
Dept: INTERNAL MEDICINE | Facility: CLINIC | Age: 88
End: 2023-11-30
Payer: MEDICARE

## 2023-11-30 ENCOUNTER — NURSING HOME (OUTPATIENT)
Dept: INTERNAL MEDICINE | Facility: CLINIC | Age: 88
End: 2023-11-30
Payer: MEDICARE

## 2023-11-30 VITALS
HEART RATE: 82 BPM | RESPIRATION RATE: 16 BRPM | OXYGEN SATURATION: 96 % | SYSTOLIC BLOOD PRESSURE: 114 MMHG | DIASTOLIC BLOOD PRESSURE: 66 MMHG | TEMPERATURE: 97.7 F

## 2023-11-30 DIAGNOSIS — I63.81 LACUNAR STROKE: ICD-10-CM

## 2023-11-30 DIAGNOSIS — E03.9 HYPOTHYROIDISM, UNSPECIFIED TYPE: ICD-10-CM

## 2023-11-30 DIAGNOSIS — R32 URINARY INCONTINENCE, UNSPECIFIED TYPE: ICD-10-CM

## 2023-11-30 DIAGNOSIS — N17.9 ACUTE RENAL FAILURE, UNSPECIFIED ACUTE RENAL FAILURE TYPE: Primary | ICD-10-CM

## 2023-11-30 DIAGNOSIS — I10 ESSENTIAL HYPERTENSION: ICD-10-CM

## 2023-11-30 DIAGNOSIS — N39.0 RECURRENT UTI: ICD-10-CM

## 2023-11-30 NOTE — LETTER
Nursing Home History and Physical       Maykel CandaceDO zack  [address] [place] [address] Phone: [phone]  Fax: [phone]     PATIENT NAME: Megan Garcia                                                                          YOB: 1935           DATE OF SERVICE: 11/30/2023  FACILITY:  [facility]    CHIEF COMPLAINT:  Nursing facility admission      HISTORY OF PRESENT ILLNESS:   The patient is an 88-year-old female with a history of hyperlipidemia, hypertension, and CVA. She was recently hospitalized at [facility] on 11/15/2023 for severe sepsis with septic shock due to a UTI. The patient presented with a creatinine level of 3.58 mg/dL as well as a urinalysis consistent with a UTI. The patient was treated with aggressive IV fluids, and diuretics were held. She was also treated with cefepime for recurrent UTIs, and after stabilization, she was transferred to this facility for further strengthening and rehabilitation due to weakness from her acute illness.    On exam today, patient was comfortable.  Daughter was at bedside and shared concerns about buttocks rash and redness.  Renal function had not yet been followed up since admission to this facility.  Urine output was noted to be dark just today.        PAST MEDICAL & SURGICAL HISTORY:   Past Medical History:   Diagnosis Date    Allergic     Arthritis     Hyperlipidemia     Hypertension     Intervertebral disc displacement     Left periventricular lacunar stroke 10/19/2015      Past Surgical History:   Procedure Laterality Date    LAMINECTOMY  1974    TOTAL KNEE ARTHROPLASTY Left 2004         MEDICATIONS:  I have reviewed and reconciled the patients medication list in the patients chart at the skilled nursing facility on 11/30/2023.      ALLERGIES:  No Known Allergies      SOCIAL HISTORY:  Social History     Socioeconomic History    Marital status:    Tobacco Use    Smoking status: Never    Smokeless tobacco:  Never   Substance and Sexual Activity    Alcohol use: No    Drug use: No    Sexual activity: Defer       FAMILY HISTORY:  Family History   Problem Relation Age of Onset    Coronary artery disease Father         REVIEW OF SYSTEMS:  Review of Systems   Constitutional:  Negative for chills, fatigue and fever.   HENT:  Negative for congestion, ear pain, rhinorrhea, sinus pressure and sore throat.    Eyes:  Negative for visual disturbance.   Respiratory:  Negative for cough, chest tightness, shortness of breath and wheezing.    Cardiovascular:  Negative for chest pain, palpitations and leg swelling.   Gastrointestinal:  Negative for abdominal pain, blood in stool, constipation, diarrhea, nausea and vomiting.   Endocrine: Negative for polydipsia and polyuria.   Genitourinary:  Negative for dysuria and hematuria.   Musculoskeletal:  Negative for arthralgias and back pain.   Skin:  Positive for rash.   Neurological:  Negative for dizziness, light-headedness, numbness and headaches.   Psychiatric/Behavioral:  Negative for dysphoric mood and sleep disturbance. The patient is not nervous/anxious.          PHYSICAL EXAMINATION:   VITAL SIGNS: /66   Pulse 82   Temp 97.7 °F (36.5 °C)   Resp 16   SpO2 96%     Physical Exam  Vitals and nursing note reviewed.   Constitutional:       Appearance: Normal appearance. She is well-developed.      Comments: Frail elderly female resting comfortably in bed   HENT:      Head: Normocephalic and atraumatic.      Nose: Nose normal.      Mouth/Throat:      Mouth: Mucous membranes are moist.      Pharynx: No oropharyngeal exudate.   Eyes:      General: No scleral icterus.     Conjunctiva/sclera: Conjunctivae normal.      Pupils: Pupils are equal, round, and reactive to light.   Neck:      Thyroid: No thyromegaly.   Cardiovascular:      Rate and Rhythm: Normal rate and regular rhythm.      Heart sounds: Normal heart sounds. No murmur heard.     No friction rub. No gallop.   Pulmonary:       Effort: Pulmonary effort is normal. No respiratory distress.      Breath sounds: Normal breath sounds. No wheezing.   Abdominal:      General: Bowel sounds are normal. There is no distension.      Palpations: Abdomen is soft.      Tenderness: There is no abdominal tenderness.   Musculoskeletal:         General: No deformity or signs of injury.      Cervical back: Normal range of motion and neck supple.   Lymphadenopathy:      Cervical: No cervical adenopathy.   Skin:     General: Skin is warm and dry.      Findings: Erythema (buttocks) and rash present.   Neurological:      Mental Status: She is alert and oriented to person, place, and time.   Psychiatric:         Mood and Affect: Mood normal.         Behavior: Behavior normal.         RECORDS REVIEW:   Discharge Summary from [facility] 11/21/2023    ASSESSMENT   Diagnoses and all orders for this visit:    1. Acute renal failure, unspecified acute renal failure type (Primary)    2. Recurrent UTI    3. Hypothyroidism, unspecified type    4. Essential hypertension    5. Urinary incontinence, unspecified type    6. Lacunar stroke        PLAN  Acute renal failure  Creatinine had stabilized to 0.48 mg/dL by the time of discharge from [facility] on 11/21/2023. Diuretics were held during hospitalization. NSAIDs are to be avoided in the facility. Renal function will be monitored in the facility.  CBC and CMP ordered    Dark urine may be a result of dehydration.  Patient encouraged to drink more water.    Buttocks candidiasis  -Start miconazole/zinc oxide/silver sulfasalazine topical    Recurrent UTI  Completing cefepime.    Hypothyroidism  Continue Synthroid.     Hypertension  Stable on amlodipine and Benicar.    History of lacunar stroke  Continue Plavix daily.       49 Min spent with direct patient care, review of medical chart, discussion with nursing staff, and medical decision making.      [x]  Discussed Patient in detail with nursing/staff,  addressed all needs today.     [x]  Plan of Care Reviewed   [x]  PT/OT Reviewed   [x]  Order Changes  []  Discharge Plans Reviewed  [x]  Advance Directive on file with Nursing Home.   [x]  POA on file with Nursing Home.    [x]  Code Status listed and reviewed.       Maykel Maria DO.  11/30/2023      **Part of this note may be an electronic transcription/translation of spoken language to printed text using the Dragon Dictation System.**      Transcribed from ambient dictation for Maykel Maria DO by Evelyn Seth.  11/30/23   12:32 EST    Patient or patient representative verbalized consent to the visit recording.  I have personally performed the services described in this document as transcribed by the above individual, and it is both accurate and complete.

## 2023-11-30 NOTE — TELEPHONE ENCOUNTER
Rec'd 2 more faxes from [facility]. Called [phone]  [phone] and spoke to Antonia - claim # [id number]. Advised initial form and office note faxed 11/10/23 and additional info faxed 11/27/23. Antonia located all info sent

## 2023-12-14 ENCOUNTER — READMISSION MANAGEMENT (OUTPATIENT)
Dept: CALL CENTER | Facility: HOSPITAL | Age: 88
End: 2023-12-14
Payer: MEDICARE

## 2023-12-14 NOTE — OUTREACH NOTE
Prep Survey      Flowsheet Row Responses   [facility]azdanism facility patient discharged from? Non-BH   Is LACE score < 7 ? Non- Discharge   Eligibility [facility]   Date of Discharge 12/14/23   Discharge Disposition Home or Self Care   Discharge diagnosis Sepsis   Does the patient have one of the following disease processes/diagnoses(primary or secondary)? Sepsis   Prep survey completed? Yes            Galilea WHITLOCK - Registered Nurse

## 2023-12-15 ENCOUNTER — TRANSITIONAL CARE MANAGEMENT TELEPHONE ENCOUNTER (OUTPATIENT)
Dept: CALL CENTER | Facility: HOSPITAL | Age: 88
End: 2023-12-15
Payer: MEDICARE

## 2023-12-15 ENCOUNTER — TELEPHONE (OUTPATIENT)
Dept: INTERNAL MEDICINE | Facility: CLINIC | Age: 88
End: 2023-12-15
Payer: MEDICARE

## 2023-12-15 NOTE — OUTREACH NOTE
Call Center TCM Note      Flowsheet Row Responses   Taoist facility patient discharged from? Non-  [[facility]]   Does the patient have one of the following disease processes/diagnoses(primary or secondary)? Sepsis   TCM attempt successful? No   Unsuccessful attempts Attempt 1            Florence Ferguson RN    12/15/2023, 13:35 EST        
N/A

## 2023-12-15 NOTE — TELEPHONE ENCOUNTER
Caller: [facility]    Relationship: Home Health    Best call back number: [phone]     What orders are you requesting (i.e. lab or imaging): CONFIRMATION PCP WILL FOLLOW PATIENT'S CARE FOR HOME HEALTH OCCUPATIONAL THERAPY, PHYSICAL THERAPY AND SKILLED NURSING     In what timeframe would the patient need to come in: ASAP    Where will you receive your lab/imaging services: HOME     Additional notes: VERBAL CONFIRMATION IS OKAY.

## 2023-12-15 NOTE — OUTREACH NOTE
Call Center TCM Note      Flowsheet Row Responses   [facility]oroast[facility] facility patient discharged from? Non-  [[facility]]   Does the patient have one of the following disease processes/diagnoses(primary or secondary)? Sepsis   TCM attempt successful? No   Unsuccessful attempts Attempt 2            Florence Ferguson RN    12/15/2023, 16:01 EST

## 2023-12-16 ENCOUNTER — TRANSITIONAL CARE MANAGEMENT TELEPHONE ENCOUNTER (OUTPATIENT)
Dept: CALL CENTER | Facility: HOSPITAL | Age: 88
End: 2023-12-16
Payer: MEDICARE

## 2023-12-16 NOTE — OUTREACH NOTE
Call Center TCM Note      Flowsheet Row Responses   [facility] patient discharged from? Non-   Does the patient have one of the following disease processes/diagnoses(primary or secondary)? Sepsis   TCM attempt successful? Yes   Call start time 0939   Call end time 0950   General alerts for this patient Pt lives at [facility]   Discharge diagnosis Sepsis   Person spoke with today (if not patient) and relationship Clotilde   Meds reviewed with patient/caregiver? Yes   Is the patient having any side effects they believe may be caused by any medication additions or changes? No   Does the patient have all medications ordered at discharge? Yes   Is the patient taking all medications as directed (includes completed medication regime)? Yes   Does the patient have an appointment with their PCP within 7-14 days of discharge? No   Nursing Interventions --  [KYLEIGH reports she believes pt sees a Dr at [facility]]   Has home health visited the patient within 72 hours of discharge? N/A   DME comments pt uses a walker   Psychosocial issues? No   Did the patient receive a copy of their discharge instructions? --  [unsure]   What is the patient's perception of their health status since discharge? Improving  [some confusion]   TCM call completed? Yes   Call end time 0950   Would this patient benefit from a Referral to Amb Social Work? No   Is the patient interested in additional calls from an ambulatory ? No            Ora Rizvi RN    12/16/2023, 09:51 EST

## 2023-12-21 ENCOUNTER — TELEPHONE (OUTPATIENT)
Dept: INTERNAL MEDICINE | Facility: CLINIC | Age: 88
End: 2023-12-21
Payer: MEDICARE

## 2024-01-11 ENCOUNTER — OUTSIDE FACILITY SERVICE (OUTPATIENT)
Dept: INTERNAL MEDICINE | Facility: CLINIC | Age: 89
End: 2024-01-11
Payer: MEDICARE

## 2024-01-19 ENCOUNTER — TELEPHONE (OUTPATIENT)
Dept: INTERNAL MEDICINE | Facility: CLINIC | Age: 89
End: 2024-01-19
Payer: MEDICARE

## 2024-01-19 NOTE — TELEPHONE ENCOUNTER
MELIDA FROM [facility] IS WANTING TO SEE IF THEY CAN GET UA ON PT.  PT IS NOT HAVING ANY ACUTE SYMPTOMS AT THIS TIME.  ACTING CONFUSED AND A LITTLE OFF TODAY.      PLEASE CALL MELIDA BACK [phone].

## 2024-04-30 ENCOUNTER — TELEPHONE (OUTPATIENT)
Dept: INTERNAL MEDICINE | Facility: CLINIC | Age: 89
End: 2024-04-30
Payer: MEDICARE

## 2024-04-30 NOTE — TELEPHONE ENCOUNTER
Spoke with Kaushik at [facility]. She states the pt has been showing abnormal behaviors lately. She reports the pt has been refusing PT, which is out of the norm for her. She asked if you would recommend/order another UA?

## 2024-07-09 ENCOUNTER — TELEPHONE (OUTPATIENT)
Dept: INTERNAL MEDICINE | Facility: CLINIC | Age: 89
End: 2024-07-09
Payer: MEDICARE

## 2024-07-09 NOTE — TELEPHONE ENCOUNTER
PATIENTS DAUGHTER SAID LUCIA IS AT [facility] RIGHT NOW AND WILL BE TAKEN CARE OF BY NURSES.

## 2025-05-14 ENCOUNTER — DOCUMENTATION (OUTPATIENT)
Dept: FAMILY MEDICINE CLINIC | Facility: CLINIC | Age: OVER 89
End: 2025-05-14
Payer: MEDICARE

## 2025-05-14 RX ORDER — MIRTAZAPINE 7.5 MG/1
7.5 TABLET, FILM COATED ORAL NIGHTLY
COMMUNITY

## 2025-05-14 RX ORDER — NYSTATIN 100000 U/G
1 CREAM TOPICAL 2 TIMES DAILY
COMMUNITY

## 2025-05-14 RX ORDER — HYDROCORTISONE 25 MG/G
1 CREAM TOPICAL 2 TIMES DAILY
COMMUNITY

## 2025-05-14 RX ORDER — METHENAMINE HIPPURATE 1000 MG/1
1 TABLET ORAL 2 TIMES DAILY WITH MEALS
COMMUNITY

## 2025-05-14 RX ORDER — LANOLIN ALCOHOL/MO/W.PET/CERES
1000 CREAM (GRAM) TOPICAL DAILY
COMMUNITY

## 2025-05-14 RX ORDER — ACETAMINOPHEN 325 MG/1
650 TABLET ORAL 2 TIMES DAILY
COMMUNITY

## 2025-05-14 RX ORDER — ASPIRIN 81 MG/1
81 TABLET, CHEWABLE ORAL DAILY
COMMUNITY

## 2025-05-21 NOTE — PROGRESS NOTES
Nursing Home History and Physical       Maykel Maria DO  [address] [place] [address] Phone: [phone]  Fax: [phone]     PATIENT NAME: Megan Garcia                                                                          YOB: 1935           DATE OF SERVICE: 05/22/2025  FACILITY:  [facility]    CHIEF COMPLAINT:  Nursing facility admission    History of Present Illness  The patient is a 89-year-old female who was recently hospitalized at [facility] from 05/06/2025 to 05/13/2025. She has a history of multiple strokes, dementia, GERD, mood disorder, and recurrent urinary tract infections. She was recently hospitalized for concerns of altered mental status. She was transferred from [facility] when she suddenly became unresponsive while working with physical therapy. Her oxygen saturations were reportedly low until the moment that EMS arrived when she became more interactive again. Upon evaluation in the hospital, she was found to be significantly anemic with a hemoglobin of 6.3. She had 1 unit of PRBC transfused. It was thought that her syncopal episode was likely associated with hypoxia in the setting of anemia. UTI was ruled out. Due to debility and weakness, she was then transferred to this facility for further strengthening and rehabilitation.    She is currently engaged in physical exercises and is adhering to her medication regimen. She reports no edema in her lower extremities.       PAST MEDICAL & SURGICAL HISTORY:   Past Medical History:   Diagnosis Date    Allergic     Anemia     Arthritis     Cerebral infarction     Encephalopathy, unspecified     GERD without esophagitis     Hyperlipidemia     Hypertension     Hypocalcemia     Hypomagnesemia     Hypoxic episode     Intervertebral disc displacement     Irritant contact dermatitis due to fecal, urinary or dual incontinence     Left periventricular lacunar stroke 10/19/2015    Personal history of  transient ischemic attack (TIA), and cerebral infarction without residual deficits     Stroke     MULTIPLE    Underweight     Unspecified dementia, unspecified severity, without behavioral disturbance, psychotic disturbance, mood disturbance, and anxiety     Unspecified mood (affective) disorder     Urinary tract infection     Vitamin B deficiency     Vitamin D deficiency     Wheelchair dependent       Past Surgical History:   Procedure Laterality Date    BACK SURGERY      CATARACT EXTRACTION      LAMINECTOMY  1974    TOTAL KNEE ARTHROPLASTY Left 2004         MEDICATIONS:  I have reviewed and reconciled the patients medication list in the patients chart at the [facility] nursing [facility] on 05/22/2025.      ALLERGIES:  No Known Allergies      SOCIAL HISTORY:  Social History     Socioeconomic History    Marital status:    Tobacco Use    Smoking status: Never    Smokeless tobacco: Never   Substance and Sexual Activity    Alcohol use: No    Drug use: No    Sexual activity: Defer       FAMILY HISTORY:  Family History   Problem Relation Age of Onset    Osteoporosis Mother     Coronary artery disease Father     Heart attack Father         REVIEW OF SYSTEMS:  Review of Systems   Constitutional:  Negative for chills, fatigue and fever.   HENT:  Negative for congestion, ear pain, rhinorrhea, sinus pressure and sore throat.    Eyes:  Negative for visual disturbance.   Respiratory:  Negative for cough, chest tightness, shortness of breath and wheezing.    Cardiovascular:  Negative for chest pain, palpitations and leg swelling.   Gastrointestinal:  Negative for abdominal pain, blood in stool, constipation, diarrhea, nausea and vomiting.   Endocrine: Negative for polydipsia and polyuria.   Genitourinary:  Negative for dysuria and hematuria.   Musculoskeletal:  Negative for arthralgias and back pain.   Skin:  Negative for rash.   Neurological:  Negative for dizziness, light-headedness, numbness and headaches.  "  Psychiatric/Behavioral:  Negative for dysphoric mood and sleep disturbance. The patient is not nervous/anxious.        PHYSICAL EXAMINATION:   VITAL SIGNS: /74   Pulse 74   Temp 98 °F (36.7 °C)   Resp 18   Ht 157.5 cm (62\")   Wt 40.2 kg (88 lb 9.6 oz)   SpO2 98%   BMI 16.21 kg/m²     Physical Exam  Vitals and nursing note reviewed.   Constitutional:       Appearance: Normal appearance. She is well-developed.      Comments: Frail elderly female in no acute distress   HENT:      Head: Normocephalic and atraumatic.      Nose: Nose normal.      Mouth/Throat:      Mouth: Mucous membranes are moist.      Pharynx: No oropharyngeal exudate.   Eyes:      General: No scleral icterus.     Conjunctiva/sclera: Conjunctivae normal.      Pupils: Pupils are equal, round, and reactive to light.   Neck:      Thyroid: No thyromegaly.   Cardiovascular:      Rate and Rhythm: Normal rate and regular rhythm.      Heart sounds: Normal heart sounds. No murmur heard.     No friction rub. No gallop.   Pulmonary:      Effort: Pulmonary effort is normal. No respiratory distress.      Breath sounds: Normal breath sounds. No wheezing.   Abdominal:      General: Bowel sounds are normal. There is no distension.      Palpations: Abdomen is soft.      Tenderness: There is no abdominal tenderness.   Musculoskeletal:         General: No deformity or signs of injury.      Cervical back: Normal range of motion and neck supple.   Lymphadenopathy:      Cervical: No cervical adenopathy.   Skin:     General: Skin is warm and dry.      Findings: No rash.   Neurological:      Mental Status: She is alert and oriented to person, place, and time.   Psychiatric:         Mood and Affect: Mood normal.         Behavior: Behavior normal.         RECORDS REVIEW:   Discharge Summary [facility] 5/13/2025  Results  Labs   - Hemoglobin: 05/15/2025, 8.3 g/dL   - White Count: 05/15/2025, 7.0 x10^9/L   - Platelets: 05/15/2025, 262 x10^9/L   - Creatinine: " 05/15/2025, 0.64 mg/dL   - Sodium: 05/15/2025, 143 mmol/L   - Potassium: 05/15/2025, 4.8 mmol/L    ASSESSMENT   Diagnoses and all orders for this visit:    1. Hypothyroidism, unspecified type (Primary)    2. Essential hypertension    3. Anemia of chronic disease    4. Postural dizziness with presyncope    5. Debility    6. Moderate dementia without behavioral disturbance, psychotic disturbance, mood disturbance, or anxiety, unspecified dementia type    7. Recurrent strokes    8. Moderate protein-calorie malnutrition    9. Recurrent urinary tract infection        Assessment & Plan  1. Acute on chronic anemia.  - Status post 1 unit PRBC transfusion, baseline hemoglobin approximately 9.0.  - Hemoglobin will be monitored in the nursing facility.  - Labs from 05/15/2025 show hemoglobin 8.3, white count 7.0, platelets 262, creatinine 0.64, sodium 143, potassium 4.8.  - No changes in medication; she is stable.    2. Debility and weakness.  - Continue physical therapy and occupational therapy in the nursing facility for strengthening.  - Patient is performing exercises as discussed.  - No swelling in legs observed.  - Follow through with nursing instructions for rehabilitation.    3. Presyncope.  - No episodes of syncope since admission to the facility.  - Oxygen saturation was low until EMS arrival during the previous episode.  - Syncopal episode likely associated with hypoxia in the setting of anemia.  - Monitoring for any further episodes.    4. Dementia.  - Continue supportive care in the nursing facility for all ADLs.  - Delirium precautions to be taken.  - Patient is pleasant and cooperative.  - Ensure medication adherence and monitor for any changes in mental status.    5. Hypomagnesemia and hypocalcemia.  - Electrolytes were replaced during hospitalization.  - Levels may be monitored in the nursing facility.  - Labs from 05/15/2025 show phosphorus 262.  - No changes in current electrolyte management.    6. Recurrent  strokes.  - Continue home regimen of aspirin and Plavix.  - Patient has aged out of statin benefits and is not currently on statins.  - Monitor for any new neurological symptoms.  - Ensure adherence to prescribed medications.    7. Protein calorie malnutrition.  - Last reported BMI was 17.4.  - Continue nutrition supplements in the nursing facility.  - Monitor weight gain and nutritional intake.  - Encourage adequate food consumption to improve BMI.    8. Recurrent urinary tract infections.  - Continue methenamine twice a day.  - Monitor for any signs of UTI.  - Ensure adequate hydration.  - Review any new symptoms related to urinary health.         [x]  Discussed Patient in detail with nursing/staff, addressed all needs today.     [x]  Plan of Care Reviewed   [x]  PT/OT Reviewed   []  Order Changes  []  Discharge Plans Reviewed  [x]  Advance Directive on file with Nursing Home.   [x]  POA on file with Nursing Home.    [x]  Code Status listed and reviewed.     Maykel Maria DO.  5/23/2025      **Part of this note may be an electronic transcription/translation of spoken language to printed text using the Dragon Dictation System.**    Patient or patient representative verbalized consent for the use of Ambient Listening during the visit with  Maykel Maria DO for chart documentation. 5/23/2025  16:09 EDT

## 2025-05-22 ENCOUNTER — NURSING HOME (OUTPATIENT)
Dept: INTERNAL MEDICINE | Facility: CLINIC | Age: OVER 89
End: 2025-05-22
Payer: MEDICARE

## 2025-05-22 DIAGNOSIS — N39.0 RECURRENT URINARY TRACT INFECTION: ICD-10-CM

## 2025-05-22 DIAGNOSIS — D63.8 ANEMIA OF CHRONIC DISEASE: ICD-10-CM

## 2025-05-22 DIAGNOSIS — E44.0 MODERATE PROTEIN-CALORIE MALNUTRITION: ICD-10-CM

## 2025-05-22 DIAGNOSIS — I10 ESSENTIAL HYPERTENSION: ICD-10-CM

## 2025-05-22 DIAGNOSIS — R42 POSTURAL DIZZINESS WITH PRESYNCOPE: ICD-10-CM

## 2025-05-22 DIAGNOSIS — I63.9 RECURRENT STROKES: ICD-10-CM

## 2025-05-22 DIAGNOSIS — E03.9 HYPOTHYROIDISM, UNSPECIFIED TYPE: Primary | ICD-10-CM

## 2025-05-22 DIAGNOSIS — F03.B0 MODERATE DEMENTIA WITHOUT BEHAVIORAL DISTURBANCE, PSYCHOTIC DISTURBANCE, MOOD DISTURBANCE, OR ANXIETY, UNSPECIFIED DEMENTIA TYPE: ICD-10-CM

## 2025-05-22 DIAGNOSIS — R55 POSTURAL DIZZINESS WITH PRESYNCOPE: ICD-10-CM

## 2025-05-22 DIAGNOSIS — R53.81 DEBILITY: ICD-10-CM

## 2025-05-22 NOTE — LETTER
Nursing Home History and Physical       Maykel Maria DO  [address] [place] [address] Phone: [phone]  Fax: [phone]     PATIENT NAME: Megan Garcia                                                                          YOB: 1935           DATE OF SERVICE: 05/22/2025  FACILITY:  [facility]    CHIEF COMPLAINT:  Nursing facility admission    History of Present Illness  The patient is a 89-year-old female who was recently hospitalized at [facility] from 05/06/2025 to 05/13/2025. She has a history of multiple strokes, dementia, GERD, mood disorder, and recurrent urinary tract infections. She was recently hospitalized for concerns of altered mental status. She was transferred from [facility] when she suddenly became unresponsive while working with physical therapy. Her oxygen saturations were reportedly low until the moment that EMS arrived when she became more interactive again. Upon evaluation in the hospital, she was found to be significantly anemic with a hemoglobin of 6.3. She had 1 unit of PRBC transfused. It was thought that her syncopal episode was likely associated with hypoxia in the setting of anemia. UTI was ruled out. Due to debility and weakness, she was then transferred to this facility for further strengthening and rehabilitation.    She is currently engaged in physical exercises and is adhering to her medication regimen. She reports no edema in her lower extremities.       PAST MEDICAL & SURGICAL HISTORY:   Past Medical History:   Diagnosis Date   • Allergic    • Anemia    • Arthritis    • Cerebral infarction    • Encephalopathy, unspecified    • GERD without esophagitis    • Hyperlipidemia    • Hypertension    • Hypocalcemia    • Hypomagnesemia    • Hypoxic episode    • Intervertebral disc displacement    • Irritant contact dermatitis due to fecal, urinary or dual incontinence    • Left periventricular lacunar stroke 10/19/2015   • Personal  history of transient ischemic attack (TIA), and cerebral infarction without residual deficits    • Stroke     MULTIPLE   • Underweight    • Unspecified dementia, unspecified severity, without behavioral disturbance, psychotic disturbance, mood disturbance, and anxiety    • Unspecified mood (affective) disorder    • Urinary tract infection    • Vitamin B deficiency    • Vitamin D deficiency    • Wheelchair dependent       Past Surgical History:   Procedure Laterality Date   • BACK SURGERY     • CATARACT EXTRACTION     • LAMINECTOMY  1974   • TOTAL KNEE ARTHROPLASTY Left 2004         MEDICATIONS:  I have reviewed and reconciled the patients medication list in the patients chart at the [facility] nursing [facility] on 05/22/2025.      ALLERGIES:  No Known Allergies      SOCIAL HISTORY:  Social History     Socioeconomic History   • Marital status:    Tobacco Use   • Smoking status: Never   • Smokeless tobacco: Never   Substance and Sexual Activity   • Alcohol use: No   • Drug use: No   • Sexual activity: Defer       FAMILY HISTORY:  Family History   Problem Relation Age of Onset   • Osteoporosis Mother    • Coronary artery disease Father    • Heart attack Father         REVIEW OF SYSTEMS:  Review of Systems   Constitutional:  Negative for chills, fatigue and fever.   HENT:  Negative for congestion, ear pain, rhinorrhea, sinus pressure and sore throat.    Eyes:  Negative for visual disturbance.   Respiratory:  Negative for cough, chest tightness, shortness of breath and wheezing.    Cardiovascular:  Negative for chest pain, palpitations and leg swelling.   Gastrointestinal:  Negative for abdominal pain, blood in stool, constipation, diarrhea, nausea and vomiting.   Endocrine: Negative for polydipsia and polyuria.   Genitourinary:  Negative for dysuria and hematuria.   Musculoskeletal:  Negative for arthralgias and back pain.   Skin:  Negative for rash.   Neurological:  Negative for dizziness, light-headedness, numbness  "and headaches.   Psychiatric/Behavioral:  Negative for dysphoric mood and sleep disturbance. The patient is not nervous/anxious.        PHYSICAL EXAMINATION:   VITAL SIGNS: /74   Pulse 74   Temp 98 °F (36.7 °C)   Resp 18   Ht 157.5 cm (62\")   Wt 40.2 kg (88 lb 9.6 oz)   SpO2 98%   BMI 16.21 kg/m²     Physical Exam  Vitals and nursing note reviewed.   Constitutional:       Appearance: Normal appearance. She is well-developed.      Comments: Frail elderly female in no acute distress   HENT:      Head: Normocephalic and atraumatic.      Nose: Nose normal.      Mouth/Throat:      Mouth: Mucous membranes are moist.      Pharynx: No oropharyngeal exudate.   Eyes:      General: No scleral icterus.     Conjunctiva/sclera: Conjunctivae normal.      Pupils: Pupils are equal, round, and reactive to light.   Neck:      Thyroid: No thyromegaly.   Cardiovascular:      Rate and Rhythm: Normal rate and regular rhythm.      Heart sounds: Normal heart sounds. No murmur heard.     No friction rub. No gallop.   Pulmonary:      Effort: Pulmonary effort is normal. No respiratory distress.      Breath sounds: Normal breath sounds. No wheezing.   Abdominal:      General: Bowel sounds are normal. There is no distension.      Palpations: Abdomen is soft.      Tenderness: There is no abdominal tenderness.   Musculoskeletal:         General: No deformity or signs of injury.      Cervical back: Normal range of motion and neck supple.   Lymphadenopathy:      Cervical: No cervical adenopathy.   Skin:     General: Skin is warm and dry.      Findings: No rash.   Neurological:      Mental Status: She is alert and oriented to person, place, and time.   Psychiatric:         Mood and Affect: Mood normal.         Behavior: Behavior normal.         RECORDS REVIEW:   Discharge Summary [facility] 5/13/2025  Results  Labs   - Hemoglobin: 05/15/2025, 8.3 g/dL   - White Count: 05/15/2025, 7.0 x10^9/L   - Platelets: 05/15/2025, 262 x10^9/L   - " Creatinine: 05/15/2025, 0.64 mg/dL   - Sodium: 05/15/2025, 143 mmol/L   - Potassium: 05/15/2025, 4.8 mmol/L    ASSESSMENT   Diagnoses and all orders for this visit:    1. Hypothyroidism, unspecified type (Primary)    2. Essential hypertension    3. Anemia of chronic disease    4. Postural dizziness with presyncope    5. Debility    6. Moderate dementia without behavioral disturbance, psychotic disturbance, mood disturbance, or anxiety, unspecified dementia type    7. Recurrent strokes    8. Moderate protein-calorie malnutrition    9. Recurrent urinary tract infection        Assessment & Plan  1. Acute on chronic anemia.  - Status post 1 unit PRBC transfusion, baseline hemoglobin approximately 9.0.  - Hemoglobin will be monitored in the nursing facility.  - Labs from 05/15/2025 show hemoglobin 8.3, white count 7.0, platelets 262, creatinine 0.64, sodium 143, potassium 4.8.  - No changes in medication; she is stable.    2. Debility and weakness.  - Continue physical therapy and occupational therapy in the nursing facility for strengthening.  - Patient is performing exercises as discussed.  - No swelling in legs observed.  - Follow through with nursing instructions for rehabilitation.    3. Presyncope.  - No episodes of syncope since admission to the facility.  - Oxygen saturation was low until EMS arrival during the previous episode.  - Syncopal episode likely associated with hypoxia in the setting of anemia.  - Monitoring for any further episodes.    4. Dementia.  - Continue supportive care in the nursing facility for all ADLs.  - Delirium precautions to be taken.  - Patient is pleasant and cooperative.  - Ensure medication adherence and monitor for any changes in mental status.    5. Hypomagnesemia and hypocalcemia.  - Electrolytes were replaced during hospitalization.  - Levels may be monitored in the nursing facility.  - Labs from 05/15/2025 show phosphorus 262.  - No changes in current electrolyte  management.    6. Recurrent strokes.  - Continue home regimen of aspirin and Plavix.  - Patient has aged out of statin benefits and is not currently on statins.  - Monitor for any new neurological symptoms.  - Ensure adherence to prescribed medications.    7. Protein calorie malnutrition.  - Last reported BMI was 17.4.  - Continue nutrition supplements in the nursing facility.  - Monitor weight gain and nutritional intake.  - Encourage adequate food consumption to improve BMI.    8. Recurrent urinary tract infections.  - Continue methenamine twice a day.  - Monitor for any signs of UTI.  - Ensure adequate hydration.  - Review any new symptoms related to urinary health.         [x]  Discussed Patient in detail with nursing/staff, addressed all needs today.     [x]  Plan of Care Reviewed   [x]  PT/OT Reviewed   []  Order Changes  []  Discharge Plans Reviewed  [x]  Advance Directive on file with Nursing Home.   [x]  POA on file with Nursing Home.    [x]  Code Status listed and reviewed.     Maykel Maria DO.  5/23/2025      **Part of this note may be an electronic transcription/translation of spoken language to printed text using the Dragon Dictation System.**    Patient or patient representative verbalized consent for the use of Ambient Listening during the visit with  Maykel Maria DO for chart documentation. 5/23/2025  16:09 EDT

## 2025-05-23 VITALS
HEART RATE: 74 BPM | OXYGEN SATURATION: 98 % | RESPIRATION RATE: 18 BRPM | SYSTOLIC BLOOD PRESSURE: 120 MMHG | TEMPERATURE: 98 F | DIASTOLIC BLOOD PRESSURE: 74 MMHG | HEIGHT: 62 IN | WEIGHT: 88.6 LBS | BODY MASS INDEX: 16.3 KG/M2